# Patient Record
Sex: FEMALE | Race: WHITE | Employment: PART TIME | URBAN - METROPOLITAN AREA
[De-identification: names, ages, dates, MRNs, and addresses within clinical notes are randomized per-mention and may not be internally consistent; named-entity substitution may affect disease eponyms.]

---

## 2017-03-13 ENCOUNTER — OFFICE VISIT (OUTPATIENT)
Dept: FAMILY MEDICINE CLINIC | Facility: CLINIC | Age: 42
End: 2017-03-13

## 2017-03-13 VITALS
DIASTOLIC BLOOD PRESSURE: 70 MMHG | WEIGHT: 190 LBS | HEART RATE: 84 BPM | TEMPERATURE: 98 F | SYSTOLIC BLOOD PRESSURE: 100 MMHG | OXYGEN SATURATION: 98 % | RESPIRATION RATE: 14 BRPM | BODY MASS INDEX: 32 KG/M2

## 2017-03-13 DIAGNOSIS — Z79.899 MEDICATION MANAGEMENT: ICD-10-CM

## 2017-03-13 DIAGNOSIS — F33.1 MODERATE EPISODE OF RECURRENT MAJOR DEPRESSIVE DISORDER (HCC): ICD-10-CM

## 2017-03-13 DIAGNOSIS — J01.40 ACUTE NON-RECURRENT PANSINUSITIS: Primary | ICD-10-CM

## 2017-03-13 PROCEDURE — 99214 OFFICE O/P EST MOD 30 MIN: CPT | Performed by: FAMILY MEDICINE

## 2017-03-13 RX ORDER — AMOXICILLIN AND CLAVULANATE POTASSIUM 875; 125 MG/1; MG/1
1 TABLET, FILM COATED ORAL 2 TIMES DAILY
Qty: 20 TABLET | Refills: 0 | Status: SHIPPED | OUTPATIENT
Start: 2017-03-13 | End: 2017-03-23

## 2017-03-13 RX ORDER — BUPROPION HYDROCHLORIDE 150 MG/1
150 TABLET ORAL DAILY
Qty: 30 TABLET | Refills: 1 | Status: SHIPPED | OUTPATIENT
Start: 2017-03-13 | End: 2017-04-24 | Stop reason: DRUGHIGH

## 2017-03-13 NOTE — PROGRESS NOTES
HPI:   Irving Jaquez is a 42year old female who presents for upper respiratory symptoms for  5  days. Patient reports congestion, sinus pain, prior history of sinusitis, denies fever, denies cough.   Pt. States that she started wellbutrin XL 1.5 weeks ago exertion  GI: no nausea or abdominal pain  NEURO: denies headaches    EXAM:   /70 mmHg  Pulse 84  Temp(Src) 97.8 °F (36.6 °C) (Oral)  Resp 14  Wt 190 lb  SpO2 98%  GENERAL: well developed, well nourished,in no apparent distress  SKIN: no rashes,no mad[place]

## 2017-04-24 NOTE — PROGRESS NOTES
Eloisa Borjas is a 42year old female. HPI:   Pt. Is here for follow up. She states since being treated for sinus infection. Has a hx of sinus disease. Seen ENT in the past.  Not a candidate for sx. Pt. Complains of fatigue.   Has one more semester of Pulse 88  Temp(Src) 98.1 °F (36.7 °C) (Oral)  Resp 14  Wt 192 lb  GENERAL: well developed, well nourished,in no apparent distress  PSYCHE: normal mood and affect  SKIN: no rashes,no suspicious lesions  HEENT: atraumatic, normocephalic,ears and throat with

## 2017-06-06 ENCOUNTER — HOSPITAL ENCOUNTER (OUTPATIENT)
Age: 42
Discharge: HOME | End: 2017-06-06
Payer: COMMERCIAL

## 2017-06-06 VITALS
OXYGEN SATURATION: 100 % | SYSTOLIC BLOOD PRESSURE: 112 MMHG | HEART RATE: 101 BPM | TEMPERATURE: 98 F | DIASTOLIC BLOOD PRESSURE: 72 MMHG | RESPIRATION RATE: 18 BRPM | BODY MASS INDEX: 30 KG/M2 | WEIGHT: 180 LBS

## 2017-06-06 DIAGNOSIS — A09 TRAVELERS' DIARRHEA: Primary | ICD-10-CM

## 2017-06-06 DIAGNOSIS — R14.0 ABDOMINAL BLOATING: ICD-10-CM

## 2017-06-06 DIAGNOSIS — R11.0 NAUSEA: ICD-10-CM

## 2017-06-06 DIAGNOSIS — E87.6 HYPOKALEMIA: ICD-10-CM

## 2017-06-06 PROCEDURE — 81002 URINALYSIS NONAUTO W/O SCOPE: CPT

## 2017-06-06 PROCEDURE — 87086 URINE CULTURE/COLONY COUNT: CPT | Performed by: PHYSICIAN ASSISTANT

## 2017-06-06 PROCEDURE — 99214 OFFICE O/P EST MOD 30 MIN: CPT

## 2017-06-06 PROCEDURE — 96360 HYDRATION IV INFUSION INIT: CPT

## 2017-06-06 PROCEDURE — 80047 BASIC METABLC PNL IONIZED CA: CPT

## 2017-06-06 PROCEDURE — 81025 URINE PREGNANCY TEST: CPT

## 2017-06-06 PROCEDURE — 85025 COMPLETE CBC W/AUTO DIFF WBC: CPT | Performed by: PHYSICIAN ASSISTANT

## 2017-06-06 RX ORDER — DICYCLOMINE HCL 20 MG
20 TABLET ORAL ONCE
Status: COMPLETED | OUTPATIENT
Start: 2017-06-06 | End: 2017-06-06

## 2017-06-06 RX ORDER — ONDANSETRON 4 MG/1
4 TABLET, ORALLY DISINTEGRATING ORAL ONCE
Status: COMPLETED | OUTPATIENT
Start: 2017-06-06 | End: 2017-06-06

## 2017-06-06 RX ORDER — SODIUM CHLORIDE 9 MG/ML
1000 INJECTION, SOLUTION INTRAVENOUS ONCE
Status: COMPLETED | OUTPATIENT
Start: 2017-06-06 | End: 2017-06-06

## 2017-06-06 RX ORDER — CIPROFLOXACIN 500 MG/1
500 TABLET, FILM COATED ORAL 2 TIMES DAILY
Qty: 10 TABLET | Refills: 0 | Status: SHIPPED | OUTPATIENT
Start: 2017-06-06 | End: 2017-06-11

## 2017-06-06 RX ORDER — DICYCLOMINE HCL 20 MG
20 TABLET ORAL 4 TIMES DAILY PRN
Qty: 30 TABLET | Refills: 0 | Status: SHIPPED | OUTPATIENT
Start: 2017-06-06 | End: 2017-07-06

## 2017-06-06 RX ORDER — POTASSIUM CHLORIDE 20 MEQ/1
40 TABLET, EXTENDED RELEASE ORAL ONCE
Status: COMPLETED | OUTPATIENT
Start: 2017-06-06 | End: 2017-06-06

## 2017-06-06 RX ORDER — ONDANSETRON 4 MG/1
4 TABLET, ORALLY DISINTEGRATING ORAL EVERY 4 HOURS PRN
Qty: 20 TABLET | Refills: 0 | Status: SHIPPED | OUTPATIENT
Start: 2017-06-06 | End: 2017-08-30

## 2017-06-07 NOTE — ED PROVIDER NOTES
Patient Seen in: THE [facility] [place]    History   Patient presents with:  Nausea/Vomiting/Diarrhea (gastrointestinal)    Stated Complaint: diarrhea,abdom pain, nausea x4 days     HPI    35 yo female here with c/o diarrhea and N x 4 days. listed in today's nurse's notes are reviewed and agree. The patient's family history is reviewed and is noncontributory to the presenting problem, except as indicated as above.     Review of Systems    Positive for stated complaint: diarrhea,abdom pain, n Chloride 99 (*)     ISTAT Creatinine 1.1 (*)     ISTAT Glucose 115 (*)     All other components within normal limits   POCT PREGNANCY, URINE - Normal   POCT CBC   URINE CULTURE, ROUTINE     NOTE: PT feels better after fluids medications in NAD  MDM       C

## 2017-06-07 NOTE — ED INITIAL ASSESSMENT (HPI)
Patient states she just returned from [facility] and [facility] and has had diarrhea for about 4 days.

## 2017-08-02 ENCOUNTER — OFFICE VISIT (OUTPATIENT)
Dept: FAMILY MEDICINE CLINIC | Facility: CLINIC | Age: 42
End: 2017-08-02

## 2017-08-02 VITALS
DIASTOLIC BLOOD PRESSURE: 70 MMHG | HEART RATE: 72 BPM | BODY MASS INDEX: 29.37 KG/M2 | HEIGHT: 64 IN | SYSTOLIC BLOOD PRESSURE: 114 MMHG | RESPIRATION RATE: 14 BRPM | WEIGHT: 172 LBS

## 2017-08-02 DIAGNOSIS — L74.0: Primary | ICD-10-CM

## 2017-08-02 PROCEDURE — 99213 OFFICE O/P EST LOW 20 MIN: CPT | Performed by: FAMILY MEDICINE

## 2017-08-03 NOTE — PROGRESS NOTES
Jaquelin Campos is a 36year old female. HPI:   Patient is concerned about a cluster of bumps over her labia majora. She states she recently was shaving in the area because she had her  for the first time in 17 years go down on her.   She was concern 0.2 0.2 - 2.0 mg/dL   Nitrite Urine Negative Negative   Leukocyte esterase urine Negative Negative   -POCT PREGNANCY, URINE   Result Value Ref Range   POCT Urine Pregnancy Negative Negative   -POCT CBC   Result Value Ref Range   WBC IC 6.7 4.0 - 13.0 x10ˆ3 murmur  GI: soft, good BS's  ; 0.2-0.4 cm white papules clusters along the medial edge of the labia minora bilaterally -- hard, nontender, not able to express pus  EXTREMITIES: no cyanosis, clubbing or edema    ASSESSMENT AND PLAN:   Sweat retention synd

## 2017-08-30 ENCOUNTER — OFFICE VISIT (OUTPATIENT)
Dept: FAMILY MEDICINE CLINIC | Facility: CLINIC | Age: 42
End: 2017-08-30

## 2017-08-30 VITALS
WEIGHT: 170 LBS | HEART RATE: 72 BPM | BODY MASS INDEX: 29.75 KG/M2 | HEIGHT: 63.5 IN | RESPIRATION RATE: 14 BRPM | DIASTOLIC BLOOD PRESSURE: 68 MMHG | SYSTOLIC BLOOD PRESSURE: 100 MMHG

## 2017-08-30 DIAGNOSIS — Z12.31 ENCOUNTER FOR SCREENING MAMMOGRAM FOR BREAST CANCER: ICD-10-CM

## 2017-08-30 DIAGNOSIS — Z00.00 ROUTINE GENERAL MEDICAL EXAMINATION AT A HEALTH CARE FACILITY: Primary | ICD-10-CM

## 2017-08-30 DIAGNOSIS — Z11.3 SCREENING FOR STD (SEXUALLY TRANSMITTED DISEASE): ICD-10-CM

## 2017-08-30 DIAGNOSIS — Z13.89 SCREENING FOR GENITOURINARY CONDITION: ICD-10-CM

## 2017-08-30 DIAGNOSIS — Z12.4 SCREENING FOR CERVICAL CANCER: ICD-10-CM

## 2017-08-30 DIAGNOSIS — Z00.00 LABORATORY EXAMINATION ORDERED AS PART OF A ROUTINE GENERAL MEDICAL EXAMINATION: ICD-10-CM

## 2017-08-30 LAB
BILIRUBIN: 0
GLUCOSE (URINE DIPSTICK): 0 MG/DL
KETONES (URINE DIPSTICK): 0 MG/DL
LEUKOCYTES: 0
MULTISTIX LOT#: NORMAL NUMERIC
NITRITE, URINE: 0
OCCULT BLOOD: 0
PH, URINE: 5.5 (ref 4.5–8)
PROTEIN (URINE DIPSTICK): 0 MG/DL
SPECIFIC GRAVITY: 1.01 (ref 1–1.03)
UROBILINOGEN,SEMI-QN: 0 MG/DL (ref 0–1.9)

## 2017-08-30 PROCEDURE — 88175 CYTOPATH C/V AUTO FLUID REDO: CPT | Performed by: FAMILY MEDICINE

## 2017-08-30 PROCEDURE — 81003 URINALYSIS AUTO W/O SCOPE: CPT | Performed by: FAMILY MEDICINE

## 2017-08-30 PROCEDURE — 99396 PREV VISIT EST AGE 40-64: CPT | Performed by: FAMILY MEDICINE

## 2017-08-30 PROCEDURE — 87660 TRICHOMONAS VAGIN DIR PROBE: CPT | Performed by: FAMILY MEDICINE

## 2017-08-30 PROCEDURE — 87480 CANDIDA DNA DIR PROBE: CPT | Performed by: FAMILY MEDICINE

## 2017-08-30 PROCEDURE — 87624 HPV HI-RISK TYP POOLED RSLT: CPT | Performed by: FAMILY MEDICINE

## 2017-08-30 PROCEDURE — 87252 VIRUS INOCULATION TISSUE: CPT | Performed by: FAMILY MEDICINE

## 2017-08-30 PROCEDURE — 90471 IMMUNIZATION ADMIN: CPT | Performed by: FAMILY MEDICINE

## 2017-08-30 PROCEDURE — 90746 HEPB VACCINE 3 DOSE ADULT IM: CPT | Performed by: FAMILY MEDICINE

## 2017-08-30 PROCEDURE — 87510 GARDNER VAG DNA DIR PROBE: CPT | Performed by: FAMILY MEDICINE

## 2017-08-30 NOTE — H&P
CC: Annual Physical Exam    HPI:   Markos Lopez is a 36year old female who presents for a complete physical exam. Symptoms: periods are regular. Patient complains of nothing.      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 Exercise: 5 times per week.   Diet: watches fats closely, watches sugar closely and watches calories closely     REVIEW OF SYSTEMS:   GENERAL: feels well otherwise  SKIN: de for mammogram.    Self breast exam explained. Health maintenance, will check fasting Lipids, CMP, CBC, TSH c ref. Seeing holistic physician and she mary a lot of labs on her including vitamin d.    Last eye exam -- due  Last dental exam -- within the 6 m

## 2017-09-01 LAB
C TRACH DNA SPEC QL NAA+PROBE: NEGATIVE
N GONORRHOEA DNA SPEC QL NAA+PROBE: NEGATIVE

## 2017-09-02 LAB — HPV I/H RISK 1 DNA SPEC QL NAA+PROBE: NEGATIVE

## 2017-09-06 ENCOUNTER — LAB ENCOUNTER (OUTPATIENT)
Dept: LAB | Age: 42
End: 2017-09-06
Attending: FAMILY MEDICINE
Payer: COMMERCIAL

## 2017-09-06 DIAGNOSIS — Z00.00 LABORATORY EXAMINATION ORDERED AS PART OF A ROUTINE GENERAL MEDICAL EXAMINATION: ICD-10-CM

## 2017-09-06 LAB
ALBUMIN SERPL-MCNC: 3.8 G/DL (ref 3.5–4.8)
ALP LIVER SERPL-CCNC: 63 U/L (ref 37–98)
ALT SERPL-CCNC: 22 U/L (ref 14–54)
AST SERPL-CCNC: 20 U/L (ref 15–41)
BASOPHILS # BLD AUTO: 0.05 X10(3) UL (ref 0–0.1)
BASOPHILS NFR BLD AUTO: 0.9 %
BILIRUB SERPL-MCNC: 0.5 MG/DL (ref 0.1–2)
BUN BLD-MCNC: 14 MG/DL (ref 8–20)
CALCIUM BLD-MCNC: 9.1 MG/DL (ref 8.3–10.3)
CHLORIDE: 106 MMOL/L (ref 101–111)
CHOLEST SMN-MCNC: 178 MG/DL (ref ?–200)
CO2: 28 MMOL/L (ref 22–32)
CREAT BLD-MCNC: 0.92 MG/DL (ref 0.55–1.02)
EOSINOPHIL # BLD AUTO: 0.21 X10(3) UL (ref 0–0.3)
EOSINOPHIL NFR BLD AUTO: 3.8 %
ERYTHROCYTE [DISTWIDTH] IN BLOOD BY AUTOMATED COUNT: 13.3 % (ref 11.5–16)
GLUCOSE BLD-MCNC: 88 MG/DL (ref 70–99)
HCT VFR BLD AUTO: 43.5 % (ref 34–50)
HDLC SERPL-MCNC: 49 MG/DL (ref 45–?)
HDLC SERPL: 3.63 {RATIO} (ref ?–4.44)
HGB BLD-MCNC: 14 G/DL (ref 12–16)
IMMATURE GRANULOCYTE COUNT: 0.01 X10(3) UL (ref 0–1)
IMMATURE GRANULOCYTE RATIO %: 0.2 %
LDLC SERPL CALC-MCNC: 104 MG/DL (ref ?–130)
LDLC SERPL-MCNC: 25 MG/DL (ref 5–40)
LYMPHOCYTES # BLD AUTO: 1.59 X10(3) UL (ref 0.9–4)
LYMPHOCYTES NFR BLD AUTO: 28.5 %
M PROTEIN MFR SERPL ELPH: 7.2 G/DL (ref 6.1–8.3)
MCH RBC QN AUTO: 27.6 PG (ref 27–33.2)
MCHC RBC AUTO-ENTMCNC: 32.2 G/DL (ref 31–37)
MCV RBC AUTO: 85.6 FL (ref 81–100)
MONOCYTES # BLD AUTO: 0.51 X10(3) UL (ref 0.1–0.6)
MONOCYTES NFR BLD AUTO: 9.2 %
NEUTROPHIL ABS PRELIM: 3.2 X10 (3) UL (ref 1.3–6.7)
NEUTROPHILS # BLD AUTO: 3.2 X10(3) UL (ref 1.3–6.7)
NEUTROPHILS NFR BLD AUTO: 57.4 %
NONHDLC SERPL-MCNC: 129 MG/DL (ref ?–130)
PLATELET # BLD AUTO: 267 10(3)UL (ref 150–450)
POTASSIUM SERPL-SCNC: 4.2 MMOL/L (ref 3.6–5.1)
RBC # BLD AUTO: 5.08 X10(6)UL (ref 3.8–5.1)
RED CELL DISTRIBUTION WIDTH-SD: 41.4 FL (ref 35.1–46.3)
SODIUM SERPL-SCNC: 141 MMOL/L (ref 136–144)
TRIGLYCERIDES: 126 MG/DL (ref ?–150)
TSI SER-ACNC: 2.29 MIU/ML (ref 0.35–5.5)
WBC # BLD AUTO: 5.6 X10(3) UL (ref 4–13)

## 2017-09-06 PROCEDURE — 80061 LIPID PANEL: CPT | Performed by: FAMILY MEDICINE

## 2017-09-06 PROCEDURE — 80050 GENERAL HEALTH PANEL: CPT | Performed by: FAMILY MEDICINE

## 2017-09-06 PROCEDURE — 36415 COLL VENOUS BLD VENIPUNCTURE: CPT | Performed by: FAMILY MEDICINE

## 2017-10-02 NOTE — PROGRESS NOTES
Aleksandr Pacheco is a 36year old female. HPI:   Pt. Is here to discuss labs. Dr. Cary Romeo -- functional medicine. Low on B vitamins. Mildly positive thyroperoxidase and kristy and crp. She will be addressing all this.   Sweat retention syndrome -- clinda g g/dL   Albumin 3.8 3.5 - 4.8 g/dL   Sodium 141 136 - 144 mmol/L   Potassium 4.2 3.6 - 5.1 mmol/L   Chloride 106 101 - 111 mmol/L   CO2 28.0 22.0 - 32.0 mmol/L   -TSH W REFLEX TO FREE T4   Result Value Ref Range   TSH 2.290 0.350 - 5.500 mIU/mL   -CBC W/ DI laboratory test  (primary encounter diagnosis)  Moderate episode of recurrent major depressive disorder (hcc)  Family planning counseling  Vaccine counseling    No orders of the defined types were placed in this encounter.       Meds & Refills for this Visi

## 2017-10-03 ENCOUNTER — OFFICE VISIT (OUTPATIENT)
Dept: FAMILY MEDICINE CLINIC | Facility: CLINIC | Age: 42
End: 2017-10-03

## 2017-10-03 VITALS
DIASTOLIC BLOOD PRESSURE: 60 MMHG | RESPIRATION RATE: 14 BRPM | SYSTOLIC BLOOD PRESSURE: 90 MMHG | BODY MASS INDEX: 28 KG/M2 | HEART RATE: 92 BPM | WEIGHT: 163 LBS

## 2017-10-03 DIAGNOSIS — Z23 NEED FOR VACCINATION: ICD-10-CM

## 2017-10-03 DIAGNOSIS — R89.9 ABNORMAL LABORATORY TEST: Primary | ICD-10-CM

## 2017-10-03 DIAGNOSIS — F33.1 MODERATE EPISODE OF RECURRENT MAJOR DEPRESSIVE DISORDER (HCC): ICD-10-CM

## 2017-10-03 DIAGNOSIS — L74.0: ICD-10-CM

## 2017-10-03 DIAGNOSIS — Z71.85 VACCINE COUNSELING: ICD-10-CM

## 2017-10-03 DIAGNOSIS — Z30.09 FAMILY PLANNING COUNSELING: ICD-10-CM

## 2017-10-03 PROCEDURE — 90471 IMMUNIZATION ADMIN: CPT | Performed by: FAMILY MEDICINE

## 2017-10-03 PROCEDURE — 99214 OFFICE O/P EST MOD 30 MIN: CPT | Performed by: FAMILY MEDICINE

## 2017-10-03 PROCEDURE — 90686 IIV4 VACC NO PRSV 0.5 ML IM: CPT | Performed by: FAMILY MEDICINE

## 2017-10-03 RX ORDER — CLINDAMYCIN PHOSPHATE 10 MG/G
1 GEL TOPICAL NIGHTLY
Qty: 60 G | Refills: 5 | Status: SHIPPED | OUTPATIENT
Start: 2017-10-03 | End: 2018-02-28

## 2017-10-03 RX ORDER — NORETHINDRONE ACETATE AND ETHINYL ESTRADIOL 1; .02 MG/1; MG/1
1 TABLET ORAL DAILY
Qty: 84 TABLET | Refills: 1 | Status: SHIPPED | OUTPATIENT
Start: 2017-10-03 | End: 2018-06-05

## 2017-10-05 ENCOUNTER — TELEPHONE (OUTPATIENT)
Dept: FAMILY MEDICINE CLINIC | Facility: CLINIC | Age: 42
End: 2017-10-05

## 2017-10-05 NOTE — TELEPHONE ENCOUNTER
Received response letter from [address] stating that they do not handle PA's that the information needs to be faxed to [facility], [phone].

## 2017-10-18 ENCOUNTER — CHARTING TRANS (OUTPATIENT)
Dept: OTHER | Age: 42
End: 2017-10-18

## 2017-10-18 ENCOUNTER — IMAGING SERVICES (OUTPATIENT)
Dept: OTHER | Age: 42
End: 2017-10-18

## 2017-10-23 NOTE — TELEPHONE ENCOUNTER
Outgoing call to [facility], to check the status of PA, PA specialist informed me that the PA was not received, PA re faxed to [phone].

## 2017-10-25 RX ORDER — CLINDAMYCIN PHOSPHATE 10 MG/ML
1 SOLUTION TOPICAL 2 TIMES DAILY
Qty: 60 EACH | Refills: 2 | Status: SHIPPED | OUTPATIENT
Start: 2017-10-25 | End: 2018-02-28

## 2017-10-25 NOTE — TELEPHONE ENCOUNTER
Letter of determination received, Clindamycin phosphate gel not covered as excluded from coverage under plan. Alternative is listed as generic Cleocin-T swabs.   Please advise

## 2017-11-08 RX ORDER — BUPROPION HYDROCHLORIDE 300 MG/1
TABLET ORAL
Qty: 90 TABLET | Refills: 1 | Status: SHIPPED | OUTPATIENT
Start: 2017-11-08 | End: 2018-02-28

## 2017-11-08 NOTE — TELEPHONE ENCOUNTER
Not protocol medication. LOV : 8/30/17 physical   Last labs done :9/06/17  Next appointment :2/28/17  Please see pending medication. Refill if appropriate.    Last refill:    Date:9/30/2017  Amount :30 days   Medication:    BUPROPION XL 300MG TABLETS  Sylvain

## 2018-01-22 RX ORDER — BUPROPION HYDROCHLORIDE 300 MG/1
TABLET ORAL
Qty: 30 TABLET | Refills: 0 | OUTPATIENT
Start: 2018-01-22

## 2018-01-22 NOTE — TELEPHONE ENCOUNTER
Pt called ofc now, speaking with Iliana Roman stating she is at the pharmacy now, [place] the [place], needs wellbutrin refill now. Record shows refill request received 1/20 (saturday) afternoon.    Record shows last ofc visit 10/3/17 with [place]

## 2018-02-28 ENCOUNTER — TELEPHONE (OUTPATIENT)
Dept: FAMILY MEDICINE CLINIC | Facility: CLINIC | Age: 43
End: 2018-02-28

## 2018-02-28 NOTE — TELEPHONE ENCOUNTER
LVM to call back. Per Antony Larose would like to inform pt a stress test was added. She can call [phone] to schedule an appointment. Stress test added as a precaution.

## 2018-02-28 NOTE — PROGRESS NOTES
Wilbert Duval is a 36year old female. HPI:   Patient is here for medication follow-up. Patient states that in the last few weeks she unfortunately missed 3 doses of her Wellbutrin and noted that she definitely needs the medication.   She is currently goi past medical history on file.    Social History:  Smoking status: Never Smoker                                                              Smokeless tobacco: Never Used                      Alcohol use: Yes           0.6 oz/week     Glasses of wine: 1 per Eosinophil % 3.8 %   Basophil % 0.9 %   Immature Granulocyte % 0.2 %       REVIEW OF SYSTEMS:   GENERAL: feels well otherwise  SKIN: denies any unusual skin lesions  EYES:denies blurred vision or double vision  HEENT: denies nasal congestion, sinus pain from her ; monitor anxiety-like symptoms  Chest pressure–probably most likely due to anxiety-like symptoms and she is not sleeping well but will order a stress test  Inclusion cyst–advised sitz bath but the patient states they did not help; told her

## 2018-04-20 NOTE — PROGRESS NOTES
Mattie Ellington is a 36year old female. HPI:   Patient is here for medication follow-up.   Patient states that a few days after our last visit, she was in therapy with her  and did admit to him that she could not handle the issues with him, and their mmol/L   Potassium 4.2 3.6 - 5.1 mmol/L   Chloride 106 101 - 111 mmol/L   CO2 28.0 22.0 - 32.0 mmol/L   -TSH W REFLEX TO FREE T4   Result Value Ref Range   TSH 2.290 0.350 - 5.500 mIU/mL   -CBC W/ DIFFERENTIAL   Result Value Ref Range   WBC 5.6 4.0 - 13.0 depressive disorder (hcc)  (primary encounter diagnosis)  Follow up  Anxiety  Insomnia, unspecified type    No orders of the defined types were placed in this encounter.       Meds & Refills for this Visit:  No prescriptions requested or ordered in this enc

## 2018-06-05 RX ORDER — NORETHINDRONE ACETATE AND ETHINYL ESTRADIOL AND FERROUS FUMARATE 1MG-20(21)
KIT ORAL
Qty: 84 TABLET | Refills: 0 | Status: SHIPPED | OUTPATIENT
Start: 2018-06-05 | End: 2019-05-01

## 2018-06-21 ENCOUNTER — OFFICE VISIT (OUTPATIENT)
Dept: FAMILY MEDICINE CLINIC | Facility: CLINIC | Age: 43
End: 2018-06-21

## 2018-06-21 VITALS
TEMPERATURE: 99 F | DIASTOLIC BLOOD PRESSURE: 60 MMHG | HEIGHT: 64 IN | OXYGEN SATURATION: 100 % | RESPIRATION RATE: 20 BRPM | BODY MASS INDEX: 23.9 KG/M2 | SYSTOLIC BLOOD PRESSURE: 110 MMHG | HEART RATE: 91 BPM | WEIGHT: 140 LBS

## 2018-06-21 DIAGNOSIS — R30.0 DYSURIA: Primary | ICD-10-CM

## 2018-06-21 PROCEDURE — 99213 OFFICE O/P EST LOW 20 MIN: CPT | Performed by: NURSE PRACTITIONER

## 2018-06-21 PROCEDURE — 81003 URINALYSIS AUTO W/O SCOPE: CPT | Performed by: NURSE PRACTITIONER

## 2018-06-21 RX ORDER — NITROFURANTOIN 25; 75 MG/1; MG/1
100 CAPSULE ORAL 2 TIMES DAILY
Qty: 14 CAPSULE | Refills: 0 | Status: SHIPPED | OUTPATIENT
Start: 2018-06-21 | End: 2018-06-28

## 2018-06-21 NOTE — PROGRESS NOTES
CHIEF COMPLAINT:   Patient presents with:  Urinary Frequency: pain and pressure x 3 days    HPI:   Monika Crandall is a 39year old female who presents with symptoms of dysuria and frequency for last 3 days. Symptoms have been about the same since onset.  Gustabo Chong /60   Pulse 91   Temp 98.7 °F (37.1 °C) (Oral)   Resp 20   Ht 64\"   Wt 140 lb   LMP 06/07/2018   SpO2 100%   BMI 24.03 kg/m²   GENERAL: well developed, well nourished,in no apparent distress  CARDIO: RRR, no murmurs  LUNGS: clear to ausculation bila The patient indicates understanding of these issues and agrees to the plan. The patient is asked to contact their PCP in 3 days if not better. Seek immediate care if symptoms are worsening.      Patient Instructions     Bladder Infection, Female (Adult) The most common cause of bladder infections is bacteria from the bowels. The bacteria get onto the skin around the opening of the urethra. From there, they can get into the urine and travel up to the bladder, causing inflammation and infection.  This usuall · Urinate more often. Don't try to hold urine in for a long time. · Wear loose-fitting clothes and cotton underwear. Avoid tight-fitting pants. · Improve your diet and prevent constipation.  Eat more fresh fruit and vegetables, and fiber, and less junk an

## 2018-10-10 RX ORDER — BUPROPION HYDROCHLORIDE 300 MG/1
TABLET ORAL
Qty: 30 TABLET | Refills: 0 | Status: SHIPPED | OUTPATIENT
Start: 2018-10-10 | End: 2019-05-01

## 2018-10-10 NOTE — TELEPHONE ENCOUNTER
Not protocol medication. LOV :4/20/18 medication follow up   Last labs done :9/06/17  Next appointment :12/19/18  Please see pending medication. Refill if appropriate.    Last refill:    Date:2/28/18  Amount :30 tablets 5 refills   Medication: bupropion

## 2018-11-02 VITALS
OXYGEN SATURATION: 98 % | RESPIRATION RATE: 18 BRPM | DIASTOLIC BLOOD PRESSURE: 68 MMHG | BODY MASS INDEX: 26.12 KG/M2 | TEMPERATURE: 98.1 F | SYSTOLIC BLOOD PRESSURE: 100 MMHG | WEIGHT: 153 LBS | HEIGHT: 64 IN | HEART RATE: 102 BPM

## 2019-05-01 ENCOUNTER — HOSPITAL ENCOUNTER (OUTPATIENT)
Age: 44
Discharge: HOME | End: 2019-05-01
Attending: FAMILY MEDICINE
Payer: COMMERCIAL

## 2019-05-01 VITALS
RESPIRATION RATE: 16 BRPM | OXYGEN SATURATION: 98 % | HEART RATE: 88 BPM | TEMPERATURE: 98 F | DIASTOLIC BLOOD PRESSURE: 77 MMHG | SYSTOLIC BLOOD PRESSURE: 104 MMHG

## 2019-05-01 DIAGNOSIS — J30.2 SEASONAL ALLERGIC RHINITIS, UNSPECIFIED TRIGGER: ICD-10-CM

## 2019-05-01 DIAGNOSIS — J01.00 ACUTE NON-RECURRENT MAXILLARY SINUSITIS: Primary | ICD-10-CM

## 2019-05-01 PROCEDURE — 99213 OFFICE O/P EST LOW 20 MIN: CPT

## 2019-05-01 PROCEDURE — 99214 OFFICE O/P EST MOD 30 MIN: CPT

## 2019-05-01 RX ORDER — PREDNISONE 20 MG/1
TABLET ORAL
Qty: 5 TABLET | Refills: 0 | Status: SHIPPED | OUTPATIENT
Start: 2019-05-01 | End: 2019-05-22 | Stop reason: ALTCHOICE

## 2019-05-01 RX ORDER — FLUTICASONE PROPIONATE 50 MCG
SPRAY, SUSPENSION (ML) NASAL
Qty: 1 INHALER | Refills: 0 | Status: SHIPPED | OUTPATIENT
Start: 2019-05-01

## 2019-05-01 RX ORDER — AMOXICILLIN AND CLAVULANATE POTASSIUM 875; 125 MG/1; MG/1
875 TABLET, FILM COATED ORAL 2 TIMES DAILY
Qty: 20 TABLET | Refills: 0 | Status: SHIPPED | OUTPATIENT
Start: 2019-05-01 | End: 2019-05-22 | Stop reason: ALTCHOICE

## 2019-05-01 NOTE — ED PROVIDER NOTES
Patient Seen in: [facility]    History   Patient presents with:  Stuffy Nose    Stated Complaint: SINUS headache , mucas , sinus press x 2 weeks     HPI    This 79-year-old female presents to the office with a 2-week history of normal.  EARS: Tympanic membranes normal, EAC's normal.  NOSE: Turbinates congested, no bleeding noted. Tender over the frontal and maxillary sinuses.   PHARYNX:  No eythema or exudates, post nasal drip is noted, airway patent, uvula midline  NECK:  No cer days.  Padma Hun: 5 tablet Refills: 0    Fluticasone Propionate 50 MCG/ACT Nasal Suspension  Use 2 sprays each nostril once daily after shower. Stop if you develop nose bleeds.   Qty: 1 Inhaler Refills: 0           Take the Augmentin 875 mg 1 tablet with breakfas

## 2019-05-01 NOTE — ED INITIAL ASSESSMENT (HPI)
Complains of head and nose congestion for the last two weeks. Here today as she feels she has no energy due to discomfort.

## 2019-05-22 ENCOUNTER — OFFICE VISIT (OUTPATIENT)
Dept: FAMILY MEDICINE CLINIC | Facility: CLINIC | Age: 44
End: 2019-05-22
Payer: COMMERCIAL

## 2019-05-22 VITALS
TEMPERATURE: 99 F | RESPIRATION RATE: 16 BRPM | WEIGHT: 151 LBS | SYSTOLIC BLOOD PRESSURE: 98 MMHG | HEART RATE: 86 BPM | DIASTOLIC BLOOD PRESSURE: 68 MMHG | BODY MASS INDEX: 26.75 KG/M2 | OXYGEN SATURATION: 98 % | HEIGHT: 63 IN

## 2019-05-22 DIAGNOSIS — Z13.89 SCREENING FOR GENITOURINARY CONDITION: ICD-10-CM

## 2019-05-22 DIAGNOSIS — F33.9 EPISODE OF RECURRENT MAJOR DEPRESSIVE DISORDER, UNSPECIFIED DEPRESSION EPISODE SEVERITY (HCC): ICD-10-CM

## 2019-05-22 DIAGNOSIS — Z13.0 SCREENING FOR ENDOCRINE, METABOLIC AND IMMUNITY DISORDER: ICD-10-CM

## 2019-05-22 DIAGNOSIS — Z12.4 SCREENING FOR CERVICAL CANCER: ICD-10-CM

## 2019-05-22 DIAGNOSIS — Z00.00 ROUTINE GENERAL MEDICAL EXAMINATION AT HEALTH CARE FACILITY: Primary | ICD-10-CM

## 2019-05-22 DIAGNOSIS — Z13.29 SCREENING FOR ENDOCRINE, METABOLIC AND IMMUNITY DISORDER: ICD-10-CM

## 2019-05-22 DIAGNOSIS — Z13.228 SCREENING FOR ENDOCRINE, METABOLIC AND IMMUNITY DISORDER: ICD-10-CM

## 2019-05-22 DIAGNOSIS — Z88.9 MULTIPLE ALLERGIES: ICD-10-CM

## 2019-05-22 DIAGNOSIS — Z00.00 LABORATORY EXAMINATION ORDERED AS PART OF A ROUTINE GENERAL MEDICAL EXAMINATION: ICD-10-CM

## 2019-05-22 DIAGNOSIS — R79.89 ABNORMAL CORTISOL LEVEL: ICD-10-CM

## 2019-05-22 DIAGNOSIS — Z71.84 COUNSELING ABOUT TRAVEL: ICD-10-CM

## 2019-05-22 DIAGNOSIS — Z11.3 SCREENING EXAMINATION FOR STD (SEXUALLY TRANSMITTED DISEASE): ICD-10-CM

## 2019-05-22 DIAGNOSIS — Z30.09 FAMILY PLANNING COUNSELING: ICD-10-CM

## 2019-05-22 DIAGNOSIS — Z23 NEED FOR VACCINATION: ICD-10-CM

## 2019-05-22 DIAGNOSIS — Z12.39 SCREENING FOR BREAST CANCER: ICD-10-CM

## 2019-05-22 PROCEDURE — 81003 URINALYSIS AUTO W/O SCOPE: CPT | Performed by: NURSE PRACTITIONER

## 2019-05-22 PROCEDURE — 99396 PREV VISIT EST AGE 40-64: CPT | Performed by: NURSE PRACTITIONER

## 2019-05-22 PROCEDURE — 90715 TDAP VACCINE 7 YRS/> IM: CPT | Performed by: NURSE PRACTITIONER

## 2019-05-22 PROCEDURE — 87660 TRICHOMONAS VAGIN DIR PROBE: CPT | Performed by: NURSE PRACTITIONER

## 2019-05-22 PROCEDURE — 87624 HPV HI-RISK TYP POOLED RSLT: CPT | Performed by: NURSE PRACTITIONER

## 2019-05-22 PROCEDURE — 87591 N.GONORRHOEAE DNA AMP PROB: CPT | Performed by: NURSE PRACTITIONER

## 2019-05-22 PROCEDURE — 87480 CANDIDA DNA DIR PROBE: CPT | Performed by: NURSE PRACTITIONER

## 2019-05-22 PROCEDURE — 87491 CHLMYD TRACH DNA AMP PROBE: CPT | Performed by: NURSE PRACTITIONER

## 2019-05-22 PROCEDURE — 90471 IMMUNIZATION ADMIN: CPT | Performed by: NURSE PRACTITIONER

## 2019-05-22 PROCEDURE — 87510 GARDNER VAG DNA DIR PROBE: CPT | Performed by: NURSE PRACTITIONER

## 2019-05-22 PROCEDURE — 87625 HPV TYPES 16 & 18 ONLY: CPT | Performed by: NURSE PRACTITIONER

## 2019-05-22 RX ORDER — DULOXETIN HYDROCHLORIDE 30 MG/1
30 CAPSULE, DELAYED RELEASE ORAL DAILY
Refills: 1 | COMMUNITY
Start: 2019-03-01

## 2019-05-22 NOTE — H&P
HPI:   Markos Lopez is a 39year old female who presents for a complete physical exam. Symptoms: denies discharge, itching, burning or dysuria, periods are regular, flow is 6 days. Patient denies any history of an abnormal pap.  She reports family history Esterase Urine neg Negative    APPEARANCE clear Clear    Color Urine yellow Yellow    Multistix Lot# [id number] Numeric    Multistix Expiration Date 02/28/2019 Date          Current Outpatient Medications:  Multiple Vitamin (MULTI-VITAMIN) Oral Tab Take 1 tab exertion  CHEST:  denies breast changes or pain  CARDIOVASCULAR: denies chest pain or tightness on exertion: no edema  VASCULAR: denies leg cramps  GI: denies abdominal pain, bowel movement changes, blood in stool  : denies urinary problems, vaginal disc maintenance guidance given including vision and dental exams. Lifestyle guidance provided recommended low fat diet and aerobic exercise 30 minutes 3-4 times weekly. The patient indicates understanding of these issues and agrees to the plan.   The patient i SURFACE ANTIGEN; Future  - HCV ANTIBODY; Future  - CHLAMYDIA/GONOCOCCUS, SHIKHA;  Future  - CHLAMYDIA/GONOCOCCUS, SHIKHA  - VAGINITIS/VAGINOSIS, DNA PROBE; Future  - VAGINITIS/VAGINOSIS, DNA PROBE    10. Screening for cervical cancer  - THINPREP PAP WITH HPV REFL

## 2019-05-24 RX ORDER — METRONIDAZOLE 7.5 MG/G
1 GEL VAGINAL NIGHTLY
Qty: 1 TUBE | Refills: 0 | Status: SHIPPED | OUTPATIENT
Start: 2019-05-24 | End: 2019-05-29

## 2019-05-28 ENCOUNTER — LAB ENCOUNTER (OUTPATIENT)
Dept: LAB | Age: 44
End: 2019-05-28
Attending: FAMILY MEDICINE
Payer: COMMERCIAL

## 2019-05-28 DIAGNOSIS — Z88.9 MULTIPLE ALLERGIES: ICD-10-CM

## 2019-05-28 DIAGNOSIS — Z13.0 SCREENING FOR ENDOCRINE, METABOLIC AND IMMUNITY DISORDER: ICD-10-CM

## 2019-05-28 DIAGNOSIS — Z13.228 SCREENING FOR ENDOCRINE, METABOLIC AND IMMUNITY DISORDER: ICD-10-CM

## 2019-05-28 DIAGNOSIS — Z13.29 SCREENING FOR ENDOCRINE, METABOLIC AND IMMUNITY DISORDER: ICD-10-CM

## 2019-05-28 DIAGNOSIS — Z00.00 ROUTINE GENERAL MEDICAL EXAMINATION AT HEALTH CARE FACILITY: ICD-10-CM

## 2019-05-28 DIAGNOSIS — Z00.00 LABORATORY EXAMINATION ORDERED AS PART OF A ROUTINE GENERAL MEDICAL EXAMINATION: ICD-10-CM

## 2019-05-28 DIAGNOSIS — R79.89 ABNORMAL CORTISOL LEVEL: ICD-10-CM

## 2019-05-28 DIAGNOSIS — Z11.3 SCREENING EXAMINATION FOR STD (SEXUALLY TRANSMITTED DISEASE): ICD-10-CM

## 2019-05-28 DIAGNOSIS — R79.89 ELEVATED TSH: ICD-10-CM

## 2019-05-28 PROCEDURE — 84439 ASSAY OF FREE THYROXINE: CPT | Performed by: NURSE PRACTITIONER

## 2019-05-28 PROCEDURE — 86765 RUBEOLA ANTIBODY: CPT | Performed by: NURSE PRACTITIONER

## 2019-05-28 PROCEDURE — 86762 RUBELLA ANTIBODY: CPT | Performed by: NURSE PRACTITIONER

## 2019-05-28 PROCEDURE — 86800 THYROGLOBULIN ANTIBODY: CPT | Performed by: NURSE PRACTITIONER

## 2019-05-28 PROCEDURE — 86803 HEPATITIS C AB TEST: CPT | Performed by: NURSE PRACTITIONER

## 2019-05-28 PROCEDURE — 80061 LIPID PANEL: CPT | Performed by: NURSE PRACTITIONER

## 2019-05-28 PROCEDURE — 87389 HIV-1 AG W/HIV-1&-2 AB AG IA: CPT | Performed by: NURSE PRACTITIONER

## 2019-05-28 PROCEDURE — 86735 MUMPS ANTIBODY: CPT | Performed by: NURSE PRACTITIONER

## 2019-05-28 PROCEDURE — 86706 HEP B SURFACE ANTIBODY: CPT | Performed by: NURSE PRACTITIONER

## 2019-05-28 PROCEDURE — 82533 TOTAL CORTISOL: CPT | Performed by: NURSE PRACTITIONER

## 2019-05-28 PROCEDURE — 87340 HEPATITIS B SURFACE AG IA: CPT | Performed by: NURSE PRACTITIONER

## 2019-05-28 PROCEDURE — 86376 MICROSOMAL ANTIBODY EACH: CPT | Performed by: NURSE PRACTITIONER

## 2019-05-28 PROCEDURE — 80050 GENERAL HEALTH PANEL: CPT | Performed by: NURSE PRACTITIONER

## 2019-05-28 PROCEDURE — 36415 COLL VENOUS BLD VENIPUNCTURE: CPT | Performed by: NURSE PRACTITIONER

## 2019-05-28 PROCEDURE — 86003 ALLG SPEC IGE CRUDE XTRC EA: CPT | Performed by: NURSE PRACTITIONER

## 2019-05-28 PROCEDURE — 86780 TREPONEMA PALLIDUM: CPT | Performed by: NURSE PRACTITIONER

## 2019-05-31 ENCOUNTER — TELEPHONE (OUTPATIENT)
Dept: FAMILY MEDICINE CLINIC | Facility: CLINIC | Age: 44
End: 2019-05-31

## 2019-05-31 DIAGNOSIS — Z91.018 MULTIPLE FOOD ALLERGIES: ICD-10-CM

## 2019-05-31 DIAGNOSIS — R79.89 ELEVATED TSH: Primary | ICD-10-CM

## 2019-06-13 ENCOUNTER — PATIENT MESSAGE (OUTPATIENT)
Dept: FAMILY MEDICINE CLINIC | Facility: CLINIC | Age: 44
End: 2019-06-13

## 2019-09-05 ENCOUNTER — HOSPITAL ENCOUNTER (OUTPATIENT)
Age: 44
Discharge: HOME | End: 2019-09-05
Attending: EMERGENCY MEDICINE
Payer: COMMERCIAL

## 2019-09-05 VITALS
HEART RATE: 96 BPM | BODY MASS INDEX: 25.61 KG/M2 | SYSTOLIC BLOOD PRESSURE: 108 MMHG | DIASTOLIC BLOOD PRESSURE: 63 MMHG | HEIGHT: 64 IN | OXYGEN SATURATION: 97 % | RESPIRATION RATE: 16 BRPM | WEIGHT: 150 LBS | TEMPERATURE: 98 F

## 2019-09-05 DIAGNOSIS — J02.9 ACUTE SORE THROAT: Primary | ICD-10-CM

## 2019-09-05 LAB — POCT RAPID STREP: NEGATIVE

## 2019-09-05 PROCEDURE — 87147 CULTURE TYPE IMMUNOLOGIC: CPT | Performed by: EMERGENCY MEDICINE

## 2019-09-05 PROCEDURE — 87081 CULTURE SCREEN ONLY: CPT | Performed by: EMERGENCY MEDICINE

## 2019-09-05 PROCEDURE — 87430 STREP A AG IA: CPT | Performed by: EMERGENCY MEDICINE

## 2019-09-05 PROCEDURE — 99213 OFFICE O/P EST LOW 20 MIN: CPT

## 2019-09-05 PROCEDURE — 99214 OFFICE O/P EST MOD 30 MIN: CPT

## 2019-09-05 RX ORDER — IBUPROFEN 600 MG/1
600 TABLET ORAL EVERY 6 HOURS PRN
COMMUNITY

## 2019-09-05 NOTE — ED PROVIDER NOTES
Patient Seen in: [facility]    History   Patient presents with:  Sore Throat    Stated Complaint: Sore throat x 1 day    HPI    41-year-old female presents with a 1 day history of sore throat. She states is painful to swallow. Uvula is midline, oropharynx is clear and moist and mucous membranes are normal. No uvula swelling. No oropharyngeal exudate or tonsillar abscesses.    Minimal erythema no exudates noted uvula is midline, cobblestoning noted in posterior pharynx   Neck: Nor

## 2019-12-27 ENCOUNTER — HOSPITAL ENCOUNTER (OUTPATIENT)
Age: 44
Discharge: HOME | End: 2019-12-27
Payer: COMMERCIAL

## 2019-12-27 VITALS
DIASTOLIC BLOOD PRESSURE: 79 MMHG | SYSTOLIC BLOOD PRESSURE: 125 MMHG | OXYGEN SATURATION: 99 % | TEMPERATURE: 98 F | RESPIRATION RATE: 18 BRPM | HEART RATE: 90 BPM

## 2019-12-27 DIAGNOSIS — T19.2XXA RETAINED TAMPON, INITIAL ENCOUNTER: Primary | ICD-10-CM

## 2019-12-27 PROCEDURE — 99214 OFFICE O/P EST MOD 30 MIN: CPT

## 2020-01-26 ENCOUNTER — TELEPHONE (OUTPATIENT)
Dept: FAMILY MEDICINE CLINIC | Facility: CLINIC | Age: 45
End: 2020-01-26

## 2020-01-26 ENCOUNTER — MOBILE ENCOUNTER (OUTPATIENT)
Dept: FAMILY MEDICINE CLINIC | Facility: CLINIC | Age: 45
End: 2020-01-26

## 2020-01-26 RX ORDER — NITROFURANTOIN 25; 75 MG/1; MG/1
100 CAPSULE ORAL 2 TIMES DAILY
Qty: 20 CAPSULE | Refills: 0 | Status: SHIPPED | OUTPATIENT
Start: 2020-01-26 | End: 2020-02-05

## 2020-01-27 NOTE — TELEPHONE ENCOUNTER
Paged by patient with 24 hours of worsening dysuria and increased urinary frequency and urgency. States she gets a UTI about every 5 months and is asking for an antibiotic.   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

## 2020-12-09 ENCOUNTER — HOSPITAL ENCOUNTER (OUTPATIENT)
Dept: ULTRASOUND IMAGING | Age: 45
Discharge: HOME | End: 2020-12-09
Attending: PHYSICIAN ASSISTANT
Payer: COMMERCIAL

## 2020-12-09 ENCOUNTER — HOSPITAL ENCOUNTER (OUTPATIENT)
Dept: MAMMOGRAPHY | Age: 45
Discharge: HOME | End: 2020-12-09
Attending: PHYSICIAN ASSISTANT
Payer: COMMERCIAL

## 2020-12-09 DIAGNOSIS — N63.11 UNSPECIFIED LUMP IN THE RIGHT BREAST, UPPER OUTER QUADRANT: ICD-10-CM

## 2020-12-09 DIAGNOSIS — R10.11 RIGHT UPPER QUADRANT PAIN: ICD-10-CM

## 2020-12-09 DIAGNOSIS — H10.32 ACUTE CONJUNCTIVITIS OF LEFT EYE, UNSPECIFIED ACUTE CONJUNCTIVITIS TYPE: ICD-10-CM

## 2020-12-09 PROCEDURE — 77066 DX MAMMO INCL CAD BI: CPT | Performed by: PHYSICIAN ASSISTANT

## 2020-12-09 PROCEDURE — 77062 BREAST TOMOSYNTHESIS BI: CPT | Performed by: PHYSICIAN ASSISTANT

## 2020-12-09 PROCEDURE — 76641 ULTRASOUND BREAST COMPLETE: CPT | Performed by: PHYSICIAN ASSISTANT

## 2020-12-09 PROCEDURE — 76642 ULTRASOUND BREAST LIMITED: CPT | Performed by: PHYSICIAN ASSISTANT

## 2022-11-10 NOTE — TELEPHONE ENCOUNTER
She has a physical for 12/19/18 with Dr. Lenn Buerger. Is this enough or does she need an actual med visit. Please let me know. Quinolones Counseling:  I discussed with the patient the risks of fluoroquinolones including but not limited to GI upset, allergic reaction, drug rash, diarrhea, dizziness, photosensitivity, yeast infections, liver function test abnormalities, tendonitis/tendon rupture.

## 2024-01-04 PROBLEM — F43.29 ADJUSTMENT DISORDER WITH MIXED EMOTIONAL FEATURES: Status: ACTIVE | Noted: 2024-01-04

## 2024-01-04 PROBLEM — E06.3 AUTOIMMUNE HYPOTHYROIDISM: Status: ACTIVE | Noted: 2024-01-04

## 2024-01-04 PROBLEM — G43.909 MIGRAINE: Status: ACTIVE | Noted: 2024-01-04

## 2024-01-07 RX ORDER — INDOMETHACIN 25 MG/1
50 CAPSULE ORAL 2 TIMES DAILY WITH MEALS
COMMUNITY
Start: 2020-09-24

## 2024-01-08 ENCOUNTER — OFFICE VISIT (OUTPATIENT)
Dept: FAMILY MEDICINE CLINIC | Facility: CLINIC | Age: 49
End: 2024-01-08
Payer: COMMERCIAL

## 2024-01-08 VITALS
HEART RATE: 100 BPM | OXYGEN SATURATION: 100 % | RESPIRATION RATE: 16 BRPM | BODY MASS INDEX: 33.25 KG/M2 | TEMPERATURE: 98 F | DIASTOLIC BLOOD PRESSURE: 74 MMHG | WEIGHT: 190 LBS | SYSTOLIC BLOOD PRESSURE: 122 MMHG | HEIGHT: 63.5 IN

## 2024-01-08 DIAGNOSIS — Z00.00 ROUTINE GENERAL MEDICAL EXAMINATION AT A HEALTH CARE FACILITY: Primary | ICD-10-CM

## 2024-01-08 DIAGNOSIS — Z13.0 SCREENING FOR ENDOCRINE, METABOLIC AND IMMUNITY DISORDER: ICD-10-CM

## 2024-01-08 DIAGNOSIS — Z11.3 SCREENING FOR STD (SEXUALLY TRANSMITTED DISEASE): ICD-10-CM

## 2024-01-08 DIAGNOSIS — E06.3 AUTOIMMUNE HYPOTHYROIDISM: ICD-10-CM

## 2024-01-08 DIAGNOSIS — N39.46 URGE AND STRESS INCONTINENCE: ICD-10-CM

## 2024-01-08 DIAGNOSIS — Z00.00 LABORATORY EXAMINATION ORDERED AS PART OF A ROUTINE GENERAL MEDICAL EXAMINATION: ICD-10-CM

## 2024-01-08 DIAGNOSIS — Z12.31 SCREENING MAMMOGRAM FOR BREAST CANCER: ICD-10-CM

## 2024-01-08 DIAGNOSIS — Z13.228 SCREENING FOR ENDOCRINE, METABOLIC AND IMMUNITY DISORDER: ICD-10-CM

## 2024-01-08 DIAGNOSIS — Z13.89 SCREENING FOR GENITOURINARY CONDITION: ICD-10-CM

## 2024-01-08 DIAGNOSIS — Z12.4 SCREENING FOR MALIGNANT NEOPLASM OF CERVIX: ICD-10-CM

## 2024-01-08 DIAGNOSIS — Z13.29 SCREENING FOR ENDOCRINE, METABOLIC AND IMMUNITY DISORDER: ICD-10-CM

## 2024-01-08 LAB
ALBUMIN SERPL-MCNC: 4.1 G/DL (ref 3.4–5)
ALBUMIN/GLOB SERPL: 1.1 {RATIO} (ref 1–2)
ALP LIVER SERPL-CCNC: 75 U/L
ALT SERPL-CCNC: 35 U/L
ANION GAP SERPL CALC-SCNC: 5 MMOL/L (ref 0–18)
APPEARANCE: CLEAR
AST SERPL-CCNC: 32 U/L (ref 15–37)
BASOPHILS # BLD AUTO: 0.08 X10(3) UL (ref 0–0.2)
BASOPHILS NFR BLD AUTO: 1.4 %
BILIRUB SERPL-MCNC: 0.3 MG/DL (ref 0.1–2)
BILIRUBIN: NEGATIVE
BUN BLD-MCNC: 15 MG/DL (ref 9–23)
CALCIUM BLD-MCNC: 9.4 MG/DL (ref 8.5–10.1)
CHLORIDE SERPL-SCNC: 105 MMOL/L (ref 98–112)
CHOLEST SERPL-MCNC: 201 MG/DL (ref ?–200)
CO2 SERPL-SCNC: 27 MMOL/L (ref 21–32)
CREAT BLD-MCNC: 0.9 MG/DL
EGFRCR SERPLBLD CKD-EPI 2021: 78 ML/MIN/1.73M2 (ref 60–?)
EOSINOPHIL # BLD AUTO: 0.56 X10(3) UL (ref 0–0.7)
EOSINOPHIL NFR BLD AUTO: 9.6 %
ERYTHROCYTE [DISTWIDTH] IN BLOOD BY AUTOMATED COUNT: 13.2 %
FASTING PATIENT LIPID ANSWER: NO
FASTING STATUS PATIENT QL REPORTED: NO
GLOBULIN PLAS-MCNC: 3.8 G/DL (ref 2.8–4.4)
GLUCOSE (URINE DIPSTICK): NEGATIVE MG/DL
GLUCOSE BLD-MCNC: 96 MG/DL (ref 70–99)
HCT VFR BLD AUTO: 46.8 %
HDLC SERPL-MCNC: 48 MG/DL (ref 40–59)
HGB BLD-MCNC: 14.9 G/DL
IMM GRANULOCYTES # BLD AUTO: 0.05 X10(3) UL (ref 0–1)
IMM GRANULOCYTES NFR BLD: 0.9 %
KETONES (URINE DIPSTICK): NEGATIVE MG/DL
LDLC SERPL CALC-MCNC: 110 MG/DL (ref ?–100)
LEUKOCYTES: NEGATIVE
LYMPHOCYTES # BLD AUTO: 1.83 X10(3) UL (ref 1–4)
LYMPHOCYTES NFR BLD AUTO: 31.2 %
MCH RBC QN AUTO: 27.7 PG (ref 26–34)
MCHC RBC AUTO-ENTMCNC: 31.8 G/DL (ref 31–37)
MCV RBC AUTO: 87.2 FL
MONOCYTES # BLD AUTO: 0.51 X10(3) UL (ref 0.1–1)
MONOCYTES NFR BLD AUTO: 8.7 %
MULTISTIX LOT#: NORMAL NUMERIC
NEUTROPHILS # BLD AUTO: 2.83 X10 (3) UL (ref 1.5–7.7)
NEUTROPHILS # BLD AUTO: 2.83 X10(3) UL (ref 1.5–7.7)
NEUTROPHILS NFR BLD AUTO: 48.2 %
NITRITE, URINE: NEGATIVE
NONHDLC SERPL-MCNC: 153 MG/DL (ref ?–130)
OCCULT BLOOD: NEGATIVE
OSMOLALITY SERPL CALC.SUM OF ELEC: 285 MOSM/KG (ref 275–295)
PH, URINE: 6.5 (ref 4.5–8)
PLATELET # BLD AUTO: 334 10(3)UL (ref 150–450)
POTASSIUM SERPL-SCNC: 3.9 MMOL/L (ref 3.5–5.1)
PROT SERPL-MCNC: 7.9 G/DL (ref 6.4–8.2)
PROTEIN (URINE DIPSTICK): NEGATIVE MG/DL
RBC # BLD AUTO: 5.37 X10(6)UL
SODIUM SERPL-SCNC: 137 MMOL/L (ref 136–145)
SPECIFIC GRAVITY: 1.02 (ref 1–1.03)
T4 FREE SERPL-MCNC: 0.9 NG/DL (ref 0.8–1.7)
TRIGL SERPL-MCNC: 249 MG/DL (ref 30–149)
TSI SER-ACNC: 3.72 MIU/ML (ref 0.36–3.74)
URINE-COLOR: YELLOW
UROBILINOGEN,SEMI-QN: 0.2 MG/DL (ref 0–1.9)
VIT D+METAB SERPL-MCNC: 33 NG/ML (ref 30–100)
VLDLC SERPL CALC-MCNC: 43 MG/DL (ref 0–30)
WBC # BLD AUTO: 5.9 X10(3) UL (ref 4–11)

## 2024-01-08 PROCEDURE — 82306 VITAMIN D 25 HYDROXY: CPT | Performed by: FAMILY MEDICINE

## 2024-01-08 PROCEDURE — 87491 CHLMYD TRACH DNA AMP PROBE: CPT | Performed by: FAMILY MEDICINE

## 2024-01-08 PROCEDURE — 87389 HIV-1 AG W/HIV-1&-2 AB AG IA: CPT | Performed by: FAMILY MEDICINE

## 2024-01-08 PROCEDURE — 87591 N.GONORRHOEAE DNA AMP PROB: CPT | Performed by: FAMILY MEDICINE

## 2024-01-08 PROCEDURE — 80061 LIPID PANEL: CPT | Performed by: FAMILY MEDICINE

## 2024-01-08 PROCEDURE — 80050 GENERAL HEALTH PANEL: CPT | Performed by: FAMILY MEDICINE

## 2024-01-08 PROCEDURE — 84439 ASSAY OF FREE THYROXINE: CPT | Performed by: FAMILY MEDICINE

## 2024-01-08 RX ORDER — AMOXICILLIN AND CLAVULANATE POTASSIUM 875; 125 MG/1; MG/1
TABLET, FILM COATED ORAL
COMMUNITY
Start: 2024-01-03

## 2024-01-08 RX ORDER — RIBOFLAVIN (VITAMIN B2) 100 MG
100 TABLET ORAL DAILY
COMMUNITY

## 2024-01-08 RX ORDER — METHYLPREDNISOLONE 4 MG/1
4 TABLET ORAL AS DIRECTED
COMMUNITY
Start: 2024-01-03

## 2024-01-08 RX ORDER — FLUCONAZOLE 150 MG/1
TABLET ORAL
COMMUNITY
Start: 2024-01-03

## 2024-01-08 RX ORDER — LEVOCETIRIZINE DIHYDROCHLORIDE 5 MG/1
5 TABLET, FILM COATED ORAL EVERY EVENING
COMMUNITY

## 2024-01-08 NOTE — H&P
CC: Annual Physical Exam    HPI:   Bernadette Duff is a 48 year old female who presents for a complete physical exam. Symptoms: periods are regular. Patient complains of nothing.     Wt Readings from Last 6 Encounters:   01/08/24 190 lb (86.2 kg)   09/05/19 150 lb (68 kg)   05/22/19 151 lb (68.5 kg)   06/21/18 140 lb (63.5 kg)   04/20/18 147 lb (66.7 kg)   02/28/18 154 lb (69.9 kg)     Body mass index is 33.13 kg/m².     Results for orders placed or performed in visit on 01/08/24   Urine Dip, auto without Micro   Result Value Ref Range    Glucose Urine Negative Negative mg/dL    Bilirubin Urine Negative Negative    Ketones, UA Negative Negative - Trace mg/dL    Spec Gravity 1.025 1.005 - 1.030    Blood Urine Negative Negative    PH Urine 6.5 5.0 - 8.0    Protein Urine Negative Negative - Trace mg/dL    Urobilinogen Urine 0.2 0.2 - 1.0 mg/dL    Nitrite Urine Negative Negative    Leukocyte Esterase Urine Negative Negative    APPEARANCE clear Clear    Color Urine yellow Yellow    Multistix Lot# [id number] Numeric    Multistix Expiration Date 05/31/2024 Date       Current Outpatient Medications   Medication Sig Dispense Refill    B Complex-C-Folic Acid Oral Tab Take by mouth.      Ascorbic Acid (VITAMIN C) 100 MG Oral Tab Take 1 tablet (100 mg total) by mouth daily.      amoxicillin clavulanate 875-125 MG Oral Tab TAKE 1 TABLET BY MOUTH TWICE DAILY      fluconazole 150 MG Oral Tab TAKE 1 TABLET BY MOUTH DAILY FOR DAY 1. REPEAT IN 3 DAYS IF NO RELIEF OF SYMPTOMS      methylPREDNISolone 4 MG Oral Tablet Therapy Pack Take 1 tablet (4 mg total) by mouth As Directed. FOLLOW DIRECTIONS ON PACKAGING      levocetirizine 5 MG Oral Tab Take 1 tablet (5 mg total) by mouth every evening.      fluticasone propionate (FLOVENT HFA) 110 MCG/ACT Inhalation Aerosol Inhale 1 puff into the lungs 2 (two) times daily. 12 g 11    albuterol 108 (90 Base) MCG/ACT Inhalation Aero Soln Inhale 1 puff into the lungs every 4 (four) hours as needed for  Wheezing. 18 g 11    Azelastine HCl 137 MCG/SPRAY Nasal Solution 1 spray by Nasal route 2 (two) times daily. 30 mL 11    ibuprofen 600 MG Oral Tab Take 1 tablet (600 mg total) by mouth every 6 (six) hours as needed for Pain.      Multiple Vitamin (MULTI-VITAMIN) Oral Tab Take 1 tablet by mouth daily.      Omega-3 Fatty Acids (FISH OIL OR) Take by mouth.      Fluticasone Propionate 50 MCG/ACT Nasal Suspension Use 2 sprays each nostril once daily after shower. Stop if you develop nose bleeds. 1 Inhaler 0    indomethacin 25 MG Oral Cap Take 2 capsules (50 mg total) by mouth 2 (two) times daily with meals. (Patient not taking: Reported on 2024)        Allergies   Allergen Reactions    Levaquin [Levofloxacin] JITTERY     Can't tolerate this one    Eggs Or Egg-Derived Products OTHER (SEE COMMENTS)     Allergy to egg whites noted on serum allergy testing    Shrimp OTHER (SEE COMMENTS)     Allergy noted on serum allergy testing      Past Medical History:   Diagnosis Date    Depression       Past Surgical History:   Procedure Laterality Date          D & C  2007    SINUS SURGERY          Family History   Problem Relation Age of Onset    Psychiatric Son         bipolar    Breast Cancer Maternal Grandmother 40    Cancer Maternal Grandmother         breast at age 40's    Breast Cancer Maternal Aunt 60    Cancer Maternal Aunt         breast in her 60's    Psychiatric Mother         bipolar    Cancer Maternal Grandfather     Cancer Paternal Grandmother     Cancer Paternal Grandfather     Heart Disease Neg     Stroke Neg       Social History:   Social History     Socioeconomic History    Marital status:    Tobacco Use    Smoking status: Never    Smokeless tobacco: Never   Substance and Sexual Activity    Alcohol use: Yes     Alcohol/week: 1.0 standard drink of alcohol     Types: 1 Glasses of wine per week     Comment: 1q week    Drug use: No   Other Topics Concern    Caffeine Concern No     Comment:  off on    Exercise No     Comment: not lately     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   Exercise:  5 times per week.  Diet: watches fats closely, watches sugar closely and watches calories closely     REVIEW OF SYSTEMS:   GENERAL: feels well otherwise  SKIN: denies any unusual skin lesions  EYES:denies blurred vision or double vision  HEENT: denies nasal congestion, sinus pain or ST  LUNGS: denies shortness of breath with exertion  CARDIOVASCULAR: denies chest pain on exertion  GI: denies abdominal pain,denies heartburn  : denies dysuria, vaginal discharge or itching,periods regular   MUSCULOSKELETAL: denies back pain  NEURO: denies headaches  PSYCHE: denies depression or anxiety  HEMATOLOGIC: denies hx of anemia  ENDOCRINE: denies thyroid history  ALL/ASTHMA: denies hx of allergy or asthma    EXAM:   /74 (BP Location: Left arm, Patient Position: Sitting, Cuff Size: adult)   Pulse 100   Temp 98.1 °F (36.7 °C) (Temporal)   Resp 16   Ht 5' 3.5\" (1.613 m)   Wt 190 lb (86.2 kg)   LMP 01/01/2024 (Exact Date)   SpO2 100%   BMI 33.13 kg/m²   Body mass index is 33.13 kg/m².   GENERAL: well developed, well nourished,in no apparent distress  SKIN: no rashes,no suspicious lesions  HEENT: atraumatic, normocephalic,ears and throat are clear  EYES: EOMI,conjunctiva are clear  NECK: supple,no adenopathy,no bruits; no thyromegaly  CHEST: no chest tenderness  BREAST: no dominant or suspicious mass, no nipple discharge; fibrocystic  LUNGS: clear to auscultation  CARDIO: RRR without murmur  GI: good BS's,no masses, HSM or tenderness  :introitus is normal,scant discharge,cervix is pink,no adnexal masses or tenderness, PAP was done  MUSCULOSKELETAL: back is not tender,FROM of the back  EXTREMITIES: no cyanosis, clubbing or edema  NEURO: Oriented times three,cranial nerves are intact,motor and sensory are grossly intact    ASSESSMENT AND PLAN:   Bernadette Duff is a 48 year old female who presents  for a complete physical exam.   Pap and pelvic done.   Order put in for mammogram.    Self breast exam explained.   Health maintenance, will check fasting Lipids, CMP, CBC, TSH c ref. , vitamin D    Last eye exam -- 1/2023  Last dental exam -- 6/2023  Advised Hep B  series since not immune.  Advised to lose 10 lbs in 3 months.      Pt' s weight is Body mass index is 33.13 kg/m²., recommended low fat diet and aerobic exercise 30 minutes three times weekly.    The patient indicates understanding of these issues and agrees to the plan.  The patient is asked to return: Return in about 3 months (around 4/8/2024) for weight check.

## 2024-01-09 LAB
C TRACH DNA SPEC QL NAA+PROBE: NEGATIVE
N GONORRHOEA DNA SPEC QL NAA+PROBE: NEGATIVE

## 2024-01-12 LAB
.: NORMAL
.: NORMAL

## 2024-01-30 ENCOUNTER — HOSPITAL ENCOUNTER (OUTPATIENT)
Dept: MAMMOGRAPHY | Age: 49
Discharge: HOME | End: 2024-01-30
Attending: FAMILY MEDICINE
Payer: COMMERCIAL

## 2024-01-30 DIAGNOSIS — Z12.31 SCREENING MAMMOGRAM FOR BREAST CANCER: ICD-10-CM

## 2024-01-30 PROCEDURE — 77063 BREAST TOMOSYNTHESIS BI: CPT | Performed by: FAMILY MEDICINE

## 2024-01-30 PROCEDURE — 77067 SCR MAMMO BI INCL CAD: CPT | Performed by: FAMILY MEDICINE

## 2024-02-13 ENCOUNTER — HOSPITAL ENCOUNTER (OUTPATIENT)
Dept: MAMMOGRAPHY | Age: 49
Discharge: HOME | End: 2024-02-13
Attending: FAMILY MEDICINE
Payer: COMMERCIAL

## 2024-02-13 ENCOUNTER — HOSPITAL ENCOUNTER (OUTPATIENT)
Dept: ULTRASOUND IMAGING | Age: 49
Discharge: HOME | End: 2024-02-13
Attending: FAMILY MEDICINE
Payer: COMMERCIAL

## 2024-02-13 DIAGNOSIS — R92.2 INCONCLUSIVE MAMMOGRAM: ICD-10-CM

## 2024-02-13 PROCEDURE — 76642 ULTRASOUND BREAST LIMITED: CPT | Performed by: FAMILY MEDICINE

## 2024-02-13 PROCEDURE — 77062 BREAST TOMOSYNTHESIS BI: CPT | Performed by: FAMILY MEDICINE

## 2024-02-13 PROCEDURE — 77066 DX MAMMO INCL CAD BI: CPT | Performed by: FAMILY MEDICINE

## 2024-04-03 ENCOUNTER — TELEPHONE (OUTPATIENT)
Dept: FAMILY MEDICINE CLINIC | Facility: CLINIC | Age: 49
End: 2024-04-03

## 2024-04-03 NOTE — TELEPHONE ENCOUNTER
Vitamin D was not covered by insurance. Sent to RN to revise order to include E55 to be added to order and print and return back to me for resubmission.    Sent to Sydnee to change.    Will Zeniat patient.

## 2024-05-07 ENCOUNTER — TELEPHONE (OUTPATIENT)
Dept: FAMILY MEDICINE CLINIC | Facility: CLINIC | Age: 49
End: 2024-05-07

## 2024-05-07 DIAGNOSIS — E06.3 AUTOIMMUNE HYPOTHYROIDISM: Primary | ICD-10-CM

## 2024-05-07 RX ORDER — LEVOTHYROXINE SODIUM 0.03 MG/1
25 TABLET ORAL
Qty: 90 TABLET | Refills: 0 | Status: SHIPPED | OUTPATIENT
Start: 2024-05-07

## 2024-05-07 NOTE — TELEPHONE ENCOUNTER
Per OV with Dr Clifton 4/29/24: \" Start levothyroxine 25 mcg daily and recheck labs in 6-8 weeks\".  Rx sent to pharmacy, pt notified by phone an she will check thyroid labs in 6 weeks.

## 2024-05-07 NOTE — TELEPHONE ENCOUNTER
Patient had an office visit 4/29.     Patient states she was to start a medication for hypothyroidism. She checked with the pharmacy and they do not have a prescription for it. Patient unsure of the name.    [id number]  [place], [place] - [address] AT [facility] [address], [phone], [phone]

## 2024-05-20 ENCOUNTER — TELEPHONE (OUTPATIENT)
Dept: FAMILY MEDICINE CLINIC | Facility: CLINIC | Age: 49
End: 2024-05-20

## 2024-05-20 NOTE — TELEPHONE ENCOUNTER
Via [employer], checking to see if her billing is correct now concerning her lab order. On our end it looks good but I called just to make sure.

## 2024-08-13 ENCOUNTER — OFFICE VISIT (OUTPATIENT)
Dept: FAMILY MEDICINE CLINIC | Facility: CLINIC | Age: 49
End: 2024-08-13
Payer: COMMERCIAL

## 2024-08-13 ENCOUNTER — TELEPHONE (OUTPATIENT)
Dept: FAMILY MEDICINE CLINIC | Facility: CLINIC | Age: 49
End: 2024-08-13

## 2024-08-13 VITALS
BODY MASS INDEX: 33 KG/M2 | HEIGHT: 63.5 IN | HEART RATE: 86 BPM | OXYGEN SATURATION: 98 % | DIASTOLIC BLOOD PRESSURE: 70 MMHG | SYSTOLIC BLOOD PRESSURE: 118 MMHG | RESPIRATION RATE: 16 BRPM

## 2024-08-13 DIAGNOSIS — E66.09 CLASS 1 OBESITY DUE TO EXCESS CALORIES WITHOUT SERIOUS COMORBIDITY WITH BODY MASS INDEX (BMI) OF 32.0 TO 32.9 IN ADULT: ICD-10-CM

## 2024-08-13 DIAGNOSIS — Z79.899 MEDICATION MANAGEMENT: ICD-10-CM

## 2024-08-13 DIAGNOSIS — Z23 NEED FOR VACCINATION: ICD-10-CM

## 2024-08-13 DIAGNOSIS — Z71.85 VACCINE COUNSELING: ICD-10-CM

## 2024-08-13 DIAGNOSIS — R23.8 PAPULE: Primary | ICD-10-CM

## 2024-08-13 PROCEDURE — 88304 TISSUE EXAM BY PATHOLOGIST: CPT | Performed by: FAMILY MEDICINE

## 2024-08-13 PROCEDURE — 99214 OFFICE O/P EST MOD 30 MIN: CPT | Performed by: FAMILY MEDICINE

## 2024-08-13 PROCEDURE — 88305 TISSUE EXAM BY PATHOLOGIST: CPT | Performed by: FAMILY MEDICINE

## 2024-08-13 PROCEDURE — 11302 SHAVE SKIN LESION 1.1-2.0 CM: CPT | Performed by: FAMILY MEDICINE

## 2024-08-13 NOTE — PROGRESS NOTES
Bernadette Duff is a 49 year old female.  HPI:   Patient is here to remove papule on the inner proximal medial thigh of her left leg.  Patient states she has had the lesion for a long time.  Patient is very interested in trying weight loss shot like Wegovy.  Patient is aware she is due for blood work.    Current Outpatient Medications   Medication Sig Dispense Refill    fexofenadine 180 MG Oral Tab TAKE 1 TABLET BY MOUTH DAILY. SWALLOW WHOLE WITH WATER. DO NOT TAKE WITH FRUIT JUICES      FLUoxetine 20 MG Oral Cap Take 1 capsule (20 mg total) by mouth daily. 30 capsule 2    levothyroxine 25 MCG Oral Tab Take 1 tablet (25 mcg total) by mouth before breakfast. 90 tablet 0    cetirizine 10 MG Oral Tab Take 1 tablet (10 mg total) by mouth daily.      ALPRAZolam (XANAX) 0.25 MG Oral Tab Take 1 tablet (0.25 mg total) by mouth daily as needed. 10 tablet 1    fluconazole 150 MG Oral Tab TAKE 1 TABLET BY MOUTH DAILY FOR DAY 1. REPEAT IN 3 DAYS IF NO RELIEF OF SYMPTOMS (Patient not taking: Reported on 4/29/2024)      levocetirizine 5 MG Oral Tab Take 1 tablet (5 mg total) by mouth every evening.      fluticasone propionate (FLOVENT HFA) 110 MCG/ACT Inhalation Aerosol Inhale 1 puff into the lungs 2 (two) times daily. 12 g 11    albuterol 108 (90 Base) MCG/ACT Inhalation Aero Soln Inhale 1 puff into the lungs every 4 (four) hours as needed for Wheezing. 18 g 11    Azelastine HCl 137 MCG/SPRAY Nasal Solution 1 spray by Nasal route 2 (two) times daily. 30 mL 11    ibuprofen 600 MG Oral Tab Take 1 tablet (600 mg total) by mouth every 6 (six) hours as needed for Pain.      Fluticasone Propionate 50 MCG/ACT Nasal Suspension Use 2 sprays each nostril once daily after shower. Stop if you develop nose bleeds. 1 Inhaler 0     No current facility-administered medications for this visit.      Allergies   Allergen Reactions    Levaquin [Levofloxacin] JITTERY     Can't tolerate this one    Dander UNKNOWN    Eggs Or Egg-Derived Products OTHER  (SEE COMMENTS)     Allergy to egg whites noted on serum allergy testing    Grass UNKNOWN    Shrimp OTHER (SEE COMMENTS)     Allergy noted on serum allergy testing    Levothyroxine DIARRHEA      Past Medical History:    Depression      Social History:  Social History     Socioeconomic History    Marital status:    Tobacco Use    Smoking status: Never    Smokeless tobacco: Never   Substance and Sexual Activity    Alcohol use: Yes     Alcohol/week: 1.0 standard drink of alcohol     Types: 1 Glasses of wine per week     Comment: 1q week    Drug use: No   Other Topics Concern    Caffeine Concern No     Comment: off on    Exercise No     Comment: not lately        Results for orders placed or performed in visit on 08/13/24   Specimen to Pathology, Tissue   Result Value Ref Range    Case Report       Surgical Pathology                                Case: [id number]                                   Authorizing Provider:  Sophie Clifton DO          Collected:           08/13/2024 04:23 PM          Ordering Location:     [facility]    Received:            08/13/2024 04:23 PM                                 [facility]                                                                   Pathologist:           Jesus Worrell MD                                                             Specimen:    Thigh, left, Left thigh                                                                    Final Diagnosis:       Skin, left thigh, excision:  -Acrochordon.      Embedded Images      Clinical Information       R23.8 Papule.         Gross Description       A. Labeled with the patient's name, MRN, and \"left thigh\"  Received in formalin: An unoriented rubbery, granular, brown-tan skin fragment measuring 1.4 x 1.0 x 0.8 cm.  The specimen is inked blue along the resection margin, trisected and submitted entirely in  A1.    ([id number])         REVIEW OF SYSTEMS:   GENERAL: feels well otherwise  SKIN: denies any unusual skin lesions; papule  LUNGS: denies shortness of breath with exertion  CARDIOVASCULAR: denies chest pain on exertion  GI: denies abdominal pain,denies heartburn  MUSCULOSKELETAL: denies back pain  EXTREMITIES:  No pain or numbness    EXAM:   /70 (BP Location: Left arm, Patient Position: Sitting, Cuff Size: adult)   Pulse 86   Resp 16   Ht 5' 3.5\" (1.613 m)   LMP 08/05/2024 (Exact Date)   SpO2 98%   BMI 32.63 kg/m²   GENERAL: well developed, well nourished,in no apparent distress  SKIN: no rashes,no suspicious lesions  Area with papule on her left proximal medial thigh cleaned with alcohol.  Numbed with 1% Xylocaine with epi-2 cc.  Area cleaned with Betadine.  Lesion which she has a papule with approximately 1 point centimeter base shaved.  Silver nitrate stopped bleeding.  No complication.  Bacitracin placed.  Band-Aid placed.  HEENT: atraumatic, normocephalic  NECK: supple,no adenopathy,no bruits  LUNGS: clear to auscultation  CARDIO: RRR without murmur  GI: soft, good BS's  EXTREMITIES: no cyanosis, clubbing or edema    ASSESSMENT AND PLAN:     Encounter Diagnoses   Name Primary?    Papule Yes    Class 1 obesity due to excess calories without serious comorbidity with body mass index (BMI) of 32.0 to 32.9 in adult     Medication management     Need for vaccination     Vaccine counseling        Orders Placed This Encounter   Procedures    Zoster Recombinant Adjuvanted (Shingrix -Shingles) [60610]    Specimen to Pathology, Tissue       Meds & Refills for this Visit:  Requested Prescriptions     Signed Prescriptions Disp Refills    semaglutide-weight management 0.25 MG/0.5ML Subcutaneous Solution Auto-injector 2 mL 0     Sig: Inject 0.5 mL (0.25 mg total) into the skin once a week for 4 doses.       Imaging & Consults:  ZOSTER VACC RECOMBINANT IM NJX    Papule removed and sent for pathology today.  Will send  Wegovy to see if it is covered by her insurance.  Advise blood work before starting weight loss.  Advised to set up with monthly appointments for the weight loss clinic with me.  Discussed hepatitis B vaccine series since patient is not immune and was checked in 2019 and it was not reactive.  Also discussed the shingles vaccine which we will give it another time since she has a work get away this weekend.  Advised to monitor for infection at the spot but the lesion was removed today.  Pt. Responded via my chart that she does not have any family history of thyroid cancer.    The patient indicates understanding of these issues and agrees to the plan.  Return in about 4 weeks (around 9/10/2024) for OM -15.

## 2024-08-15 ENCOUNTER — TELEPHONE (OUTPATIENT)
Dept: FAMILY MEDICINE CLINIC | Facility: CLINIC | Age: 49
End: 2024-08-15

## 2024-08-15 NOTE — TELEPHONE ENCOUNTER
I called Louisa to resubmit with new codes for the DOS: 01/08/2024. I will keep following tu to see when insurance approves and denies     [facility] and follow up with Louisa.

## 2024-08-16 ENCOUNTER — PATIENT MESSAGE (OUTPATIENT)
Dept: FAMILY MEDICINE CLINIC | Facility: CLINIC | Age: 49
End: 2024-08-16

## 2024-08-16 NOTE — TELEPHONE ENCOUNTER
From: Bernadette Duff  To: Sophie Clifton  Sent: 8/16/2024 6:16 AM CDT  Subject: Response about family history     Lisha Webster and Dr Clifton,   I got your voice message and wanted to respond that I have no family history of thyroid cancer.   Thank you!

## 2024-08-22 ENCOUNTER — LAB ENCOUNTER (OUTPATIENT)
Dept: LAB | Age: 49
End: 2024-08-22
Attending: FAMILY MEDICINE
Payer: COMMERCIAL

## 2024-08-22 DIAGNOSIS — E66.09 CLASS 1 OBESITY DUE TO EXCESS CALORIES WITHOUT SERIOUS COMORBIDITY WITH BODY MASS INDEX (BMI) OF 32.0 TO 32.9 IN ADULT: ICD-10-CM

## 2024-08-22 DIAGNOSIS — E55.9 VITAMIN D DEFICIENCY DISEASE: ICD-10-CM

## 2024-08-22 DIAGNOSIS — E06.3 AUTOIMMUNE HYPOTHYROIDISM: ICD-10-CM

## 2024-08-22 DIAGNOSIS — F43.9 STRESS: ICD-10-CM

## 2024-08-22 LAB
CORTIS SERPL-MCNC: 17.2 UG/DL
INSULIN SERPL-ACNC: 24.6 MU/L (ref 3–25)
T4 FREE SERPL-MCNC: 1.1 NG/DL (ref 0.8–1.7)
TSI SER-ACNC: 3.33 MIU/ML (ref 0.55–4.78)
VIT D+METAB SERPL-MCNC: 33.5 NG/ML (ref 30–100)

## 2024-08-22 PROCEDURE — 82306 VITAMIN D 25 HYDROXY: CPT | Performed by: FAMILY MEDICINE

## 2024-08-22 PROCEDURE — 84443 ASSAY THYROID STIM HORMONE: CPT | Performed by: FAMILY MEDICINE

## 2024-08-22 PROCEDURE — 84439 ASSAY OF FREE THYROXINE: CPT | Performed by: FAMILY MEDICINE

## 2024-08-22 PROCEDURE — 82533 TOTAL CORTISOL: CPT | Performed by: FAMILY MEDICINE

## 2024-08-22 PROCEDURE — 83520 IMMUNOASSAY QUANT NOS NONAB: CPT | Performed by: FAMILY MEDICINE

## 2024-08-22 PROCEDURE — 83525 ASSAY OF INSULIN: CPT | Performed by: FAMILY MEDICINE

## 2024-08-22 NOTE — PROGRESS NOTES
Dear Bernadette,    Your blood work is stable.  Your leptin level is pending,     Sincerely,  Dr. Clifton

## 2024-08-26 LAB — LEPTIN: 41.1 NG/ML

## 2024-09-03 ENCOUNTER — TELEPHONE (OUTPATIENT)
Dept: FAMILY MEDICINE CLINIC | Facility: CLINIC | Age: 49
End: 2024-09-03

## 2024-09-03 NOTE — TELEPHONE ENCOUNTER
Patient is calling that her semaglutide-weight management 0.25 MG/0.5ML Subcutaneous Solution Auto-injector is being denied for not having three months of weight loss clinic and she is wondering if we can resubmit as she started in april

## 2024-11-04 ENCOUNTER — PATIENT MESSAGE (OUTPATIENT)
Dept: FAMILY MEDICINE CLINIC | Facility: CLINIC | Age: 49
End: 2024-11-04

## 2024-11-04 DIAGNOSIS — E66.09 CLASS 1 OBESITY DUE TO EXCESS CALORIES WITHOUT SERIOUS COMORBIDITY WITH BODY MASS INDEX (BMI) OF 32.0 TO 32.9 IN ADULT: ICD-10-CM

## 2024-11-04 DIAGNOSIS — E66.811 CLASS 1 OBESITY DUE TO EXCESS CALORIES WITHOUT SERIOUS COMORBIDITY WITH BODY MASS INDEX (BMI) OF 32.0 TO 32.9 IN ADULT: ICD-10-CM

## 2024-11-04 NOTE — TELEPHONE ENCOUNTER
LOV- 8/13/24  NOV- 11/15/24    Last semaglutide rx was ordered 8/13/24 but dispensed 10/7/24  Pt requesting refill, will be due prior to next appt

## 2024-11-13 RX ORDER — CLINDAMYCIN PHOSPHATE 10 MG/G
1 GEL TOPICAL 2 TIMES DAILY PRN
COMMUNITY
Start: 2024-10-08

## 2024-11-13 RX ORDER — DOXYCYCLINE 100 MG/1
CAPSULE ORAL
COMMUNITY
Start: 2024-10-08

## 2024-11-15 ENCOUNTER — OFFICE VISIT (OUTPATIENT)
Dept: FAMILY MEDICINE CLINIC | Facility: CLINIC | Age: 49
End: 2024-11-15
Payer: COMMERCIAL

## 2024-11-15 VITALS
SYSTOLIC BLOOD PRESSURE: 118 MMHG | BODY MASS INDEX: 33.97 KG/M2 | HEART RATE: 90 BPM | DIASTOLIC BLOOD PRESSURE: 66 MMHG | RESPIRATION RATE: 16 BRPM | WEIGHT: 194.13 LBS | HEIGHT: 63.5 IN | OXYGEN SATURATION: 95 %

## 2024-11-15 DIAGNOSIS — Z71.82 EXERCISE COUNSELING: ICD-10-CM

## 2024-11-15 DIAGNOSIS — Z23 NEED FOR VACCINATION: ICD-10-CM

## 2024-11-15 DIAGNOSIS — E66.811 CLASS 1 OBESITY DUE TO EXCESS CALORIES WITHOUT SERIOUS COMORBIDITY WITH BODY MASS INDEX (BMI) OF 33.0 TO 33.9 IN ADULT: Primary | ICD-10-CM

## 2024-11-15 DIAGNOSIS — Z71.3 DIETARY COUNSELING: ICD-10-CM

## 2024-11-15 DIAGNOSIS — E66.09 CLASS 1 OBESITY DUE TO EXCESS CALORIES WITHOUT SERIOUS COMORBIDITY WITH BODY MASS INDEX (BMI) OF 33.0 TO 33.9 IN ADULT: Primary | ICD-10-CM

## 2024-11-15 DIAGNOSIS — Z79.899 MEDICATION MANAGEMENT: ICD-10-CM

## 2024-11-15 DIAGNOSIS — F33.1 MODERATE RECURRENT MAJOR DEPRESSION (HCC): ICD-10-CM

## 2024-11-15 DIAGNOSIS — Z71.85 VACCINE COUNSELING: ICD-10-CM

## 2024-11-15 DIAGNOSIS — Z12.31 SCREENING MAMMOGRAM FOR BREAST CANCER: ICD-10-CM

## 2024-11-15 DIAGNOSIS — F41.1 GAD (GENERALIZED ANXIETY DISORDER): ICD-10-CM

## 2024-11-15 PROCEDURE — 90471 IMMUNIZATION ADMIN: CPT | Performed by: FAMILY MEDICINE

## 2024-11-15 PROCEDURE — 99214 OFFICE O/P EST MOD 30 MIN: CPT | Performed by: FAMILY MEDICINE

## 2024-11-15 PROCEDURE — 3078F DIAST BP <80 MM HG: CPT | Performed by: FAMILY MEDICINE

## 2024-11-15 PROCEDURE — 3074F SYST BP LT 130 MM HG: CPT | Performed by: FAMILY MEDICINE

## 2024-11-15 PROCEDURE — 3008F BODY MASS INDEX DOCD: CPT | Performed by: FAMILY MEDICINE

## 2024-11-15 PROCEDURE — 90746 HEPB VACCINE 3 DOSE ADULT IM: CPT | Performed by: FAMILY MEDICINE

## 2024-11-15 RX ORDER — BUPROPION HYDROCHLORIDE 150 MG/1
150 TABLET ORAL DAILY
Qty: 30 TABLET | Refills: 1 | Status: SHIPPED | OUTPATIENT
Start: 2024-11-15

## 2024-11-15 RX ORDER — ALPRAZOLAM 0.25 MG/1
0.25 TABLET ORAL
Qty: 30 TABLET | Refills: 1 | Status: SHIPPED | OUTPATIENT
Start: 2024-11-15

## 2024-11-15 NOTE — PROGRESS NOTES
[facility] [address] [address]  Dept: [phone]       Patient:  Bernadette Duff  :      1975  MRN:      [id number]    Chief Complaint:    Chief Complaint   Patient presents with    Weight Check    Medication Follow-Up     Would like to discuss Wegovy and anxiety meds        SUBJECTIVE     History of Present Illness:  Bernadette is being seen today for a follow-up for weight.  Doing full time work and PHD  and very sedentary.  She is eating less and not moving at all.  Speaking to therapist once a week.  No suicidal thoughts.  Notes more anxiety than depression.  Does not find interest in things.  Fluoxetine did not help her this year.  Therapist suggested what she feels was lexapro but not sure.  Wellbutrin helped her in the past.    PHQ 11  NIKOLAY 12      Past Medical History:   Past Medical History:    Depression          OBJECTIVE     Vitals: /66 (BP Location: Left arm, Patient Position: Sitting, Cuff Size: adult)   Pulse 90   Resp 16   Ht 5' 3.5\" (1.613 m)   Wt 194 lb 2 oz (88.1 kg)   LMP 10/24/2024 (Exact Date)   SpO2 95%   BMI 33.85 kg/m²     Initial weight loss: -7   Total weight loss: -8   Start weight: 186    Wt Readings from Last 3 Encounters:   11/15/24 194 lb 2 oz (88.1 kg)   24 187 lb 2 oz (84.9 kg)   24 186 lb (84.4 kg)       Patient Medications:    Current Outpatient Medications   Medication Sig Dispense Refill    semaglutide-weight management 0.5 MG/0.5ML Subcutaneous Solution Auto-injector Inject 0.5 mL (0.5 mg total) into the skin once a week for 4 doses. 2 mL 0    buPROPion  MG Oral Tablet 24 Hr Take 1 tablet (150 mg total) by mouth daily. 30 tablet 1    ALPRAZolam (XANAX) 0.25 MG Oral Tab Take 1 tablet (0.25 mg total) by mouth daily as needed. 30 tablet 1    doxycycline 100 MG Oral Cap TAKE ONE CAPSULE BY MOUTH TWICE DAILY WITH FOOD AND WATER. NEVER ON EMPTY STOMACH. BE  CAREFUL WITH SUN      Clindamycin Phosphate 1 % External Gel Apply 1 Application topically 2 (two) times daily as needed.      cetirizine 10 MG Oral Tab Take 1 tablet (10 mg total) by mouth daily.      ALPRAZolam (XANAX) 0.25 MG Oral Tab Take 1 tablet (0.25 mg total) by mouth daily as needed. 10 tablet 1    fluticasone propionate (FLOVENT HFA) 110 MCG/ACT Inhalation Aerosol Inhale 1 puff into the lungs 2 (two) times daily. 12 g 11    albuterol 108 (90 Base) MCG/ACT Inhalation Aero Soln Inhale 1 puff into the lungs every 4 (four) hours as needed for Wheezing. 18 g 11    Azelastine HCl 137 MCG/SPRAY Nasal Solution 1 spray by Nasal route 2 (two) times daily. 30 mL 11    ibuprofen 600 MG Oral Tab Take 1 tablet (600 mg total) by mouth every 6 (six) hours as needed for Pain.      Fluticasone Propionate 50 MCG/ACT Nasal Suspension Use 2 sprays each nostril once daily after shower. Stop if you develop nose bleeds. 1 Inhaler 0     Allergies:  Levaquin [levofloxacin], Dander, Eggs or egg-derived products, Grass, Shrimp, and Levothyroxine     Social History:    Social History     Socioeconomic History    Marital status:      Spouse name: Not on file    Number of children: Not on file    Years of education: Not on file    Highest education level: Not on file   Occupational History    Not on file   Tobacco Use    Smoking status: Never    Smokeless tobacco: Never   Substance and Sexual Activity    Alcohol use: Yes     Alcohol/week: 1.0 standard drink of alcohol     Types: 1 Glasses of wine per week     Comment: 1q week    Drug use: No    Sexual activity: Not on file   Other Topics Concern     Service Not Asked    Blood Transfusions Not Asked    Caffeine Concern No     Comment: off on    Occupational Exposure Not Asked    Hobby Hazards Not Asked    Sleep Concern Not Asked    Stress Concern Not Asked    Weight Concern Not Asked    Special Diet Not Asked    Back Care Not Asked    Exercise No     Comment: not lately     Bike Helmet Not Asked    Seat Belt Not Asked    Self-Exams Not Asked   Social History Narrative    Not on file     Social Drivers of Health     Financial Resource Strain: Not on file   Food Insecurity: Not on file   Transportation Needs: Not on file   Physical Activity: Not on file   Stress: Not on file   Social Connections: Not on file   Housing Stability: Not on file     Surgical History:    Past Surgical History:   Procedure Laterality Date          D & c  2007    Sinus surgery         Family History:    Family History   Problem Relation Age of Onset    Psychiatric Son         bipolar    Breast Cancer Maternal Grandmother 40    Cancer Maternal Grandmother         breast at age 40's    Breast Cancer Maternal Aunt 60    Cancer Maternal Aunt         breast in her 60's    Psychiatric Mother         bipolar    Cancer Maternal Grandfather     Cancer Paternal Grandmother     Cancer Paternal Grandfather     Heart Disease Neg     Stroke Neg      Food Journal  Reviewed and Discussed:          Patient has a Food Journal?:  no   Patient is reading nutrition labels?  yes, most of the time.   Average Caloric Intake:          unsure  Average CHO Intake:  unsure  Is patient exercising?  no    Eating Habits  Patient states the following:  Eats 3 meal(s) per day  Length of time it takes to consume a meal:  10 mins  # of snacks per day: 0         Type of snacks: n/a  Amount of soda consumption per day:  0  Amount of water (in ounces) per day:  32 oz  Drinking between meals only:  no  Toughest challenge: Trying to get more movement in with a busy schedule and sitting a lot during the day            ROS:    Pertinent items are noted in HPI.  A comprehensive review of systems was negative.  All other systems were reviewed and are negative    Physical Exam:   /66 (BP Location: Left arm, Patient Position: Sitting, Cuff Size: adult)   Pulse 90   Resp 16   Ht 5' 3.5\" (1.613 m)   Wt 194 lb 2 oz (88.1 kg)    LMP 10/24/2024 (Exact Date)   SpO2 95%   BMI 33.85 kg/m²   General appearance: alert, appears stated age, and cooperative  Head: Normocephalic, without obvious abnormality, atraumatic  Neck: no adenopathy, no carotid bruit, no JVD, supple, symmetrical, trachea midline, and thyroid not enlarged, symmetric, no tenderness/mass/nodules  Lungs: clear to auscultation bilaterally  Heart: S1, S2 normal, no murmur, click, rub or gallop, regular rate and rhythm  Abdomen: soft, non-tender; bowel sounds normal; no masses,  no organomegaly  Extremities: extremities normal, atraumatic, no cyanosis or edema  Skin: Skin color, texture, turgor normal. No rashes or lesions    ASSESSMENT/PLAN     Encounter Diagnoses   Name Primary?    Class 1 obesity due to excess calories without serious comorbidity with body mass index (BMI) of 33.0 to 33.9 in adult Yes    Exercise counseling     Dietary counseling     Medication management     Screening mammogram for breast cancer     Need for vaccination     Vaccine counseling     Moderate recurrent major depression (HCC)     NIKOLAY (generalized anxiety disorder)        Orders Placed This Encounter   Procedures    Hep B, Adult [26407]       Nutritional Goals  Other:  food journal ; deferred dietician    Behavior Modifications Reviewed and Discussed  Drink 64 oz of water per day, Maintain a daily food journal, Identify triggers for eating and manage cues, and Eat slowly and take 20 to 30 minutes to complete each meal    Exercise Goals Reviewed and Discussed    Walk for 30 minutes daily      Meds & Refills for this Visit:  Requested Prescriptions     Signed Prescriptions Disp Refills    semaglutide-weight management 0.5 MG/0.5ML Subcutaneous Solution Auto-injector 2 mL 0     Sig: Inject 0.5 mL (0.5 mg total) into the skin once a week for 4 doses.    buPROPion  MG Oral Tablet 24 Hr 30 tablet 1     Sig: Take 1 tablet (150 mg total) by mouth daily.    ALPRAZolam (XANAX) 0.25 MG Oral Tab 30 tablet  1     Sig: Take 1 tablet (0.25 mg total) by mouth daily as needed.       Imaging & Consults:  HEPATITIS B VACCINE, OVER 20  ALEXANDER BENIGNO 2D+3D SCREENING BILAT (CPT=77067/15888)    Given Hep B #2 today.  Mammo ordered.  Start wellbutrin  mg daily.  Will find out name of SSRI? That therapist was suggesting.  Cont. Weekly therapy.  Xanax prn.  Given Hep B # 2 today.  Increase wegovy to 0.5 mg weekly.        Return in about 4 weeks (around 12/13/2024) for OM-30.

## 2024-11-15 NOTE — PROGRESS NOTES
Food Journal  Reviewed and Discussed:          Patient has a Food Journal?:  no   Patient is reading nutrition labels?  yes, most of the time.   Average Caloric Intake:          unsure  Average CHO Intake:  unsure  Is patient exercising?  no    Eating Habits  Patient states the following:  Eats 3 meal(s) per day  Length of time it takes to consume a meal:  10 mins  # of snacks per day: 0         Type of snacks: n/a  Amount of soda consumption per day:  0  Amount of water (in ounces) per day:  32 oz  Drinking between meals only:  no  Toughest challenge: Trying to get more movement in with a busy schedule and sitting a lot during the day

## 2024-11-21 ENCOUNTER — TELEPHONE (OUTPATIENT)
Dept: INTERNAL MEDICINE CLINIC | Facility: CLINIC | Age: 49
End: 2024-11-21

## 2024-12-09 RX ORDER — TAMSULOSIN HYDROCHLORIDE 0.4 MG/1
0.4 CAPSULE ORAL
COMMUNITY
Start: 2024-11-20

## 2024-12-09 RX ORDER — HYDROCODONE BITARTRATE AND ACETAMINOPHEN 5; 325 MG/1; MG/1
1 TABLET ORAL
COMMUNITY
Start: 2024-11-20

## 2024-12-10 ENCOUNTER — OFFICE VISIT (OUTPATIENT)
Dept: FAMILY MEDICINE CLINIC | Facility: CLINIC | Age: 49
End: 2024-12-10
Payer: COMMERCIAL

## 2024-12-10 VITALS
OXYGEN SATURATION: 98 % | RESPIRATION RATE: 16 BRPM | HEART RATE: 96 BPM | SYSTOLIC BLOOD PRESSURE: 110 MMHG | BODY MASS INDEX: 33.45 KG/M2 | WEIGHT: 191.13 LBS | DIASTOLIC BLOOD PRESSURE: 66 MMHG | HEIGHT: 63.5 IN

## 2024-12-10 DIAGNOSIS — Z71.82 EXERCISE COUNSELING: ICD-10-CM

## 2024-12-10 DIAGNOSIS — L02.92 BOILS: ICD-10-CM

## 2024-12-10 DIAGNOSIS — Z87.09 HISTORY OF INFLUENZA: ICD-10-CM

## 2024-12-10 DIAGNOSIS — E66.811 CLASS 1 OBESITY DUE TO EXCESS CALORIES WITHOUT SERIOUS COMORBIDITY WITH BODY MASS INDEX (BMI) OF 33.0 TO 33.9 IN ADULT: Primary | ICD-10-CM

## 2024-12-10 DIAGNOSIS — Z87.442 HISTORY OF KIDNEY STONES: ICD-10-CM

## 2024-12-10 DIAGNOSIS — E66.09 CLASS 1 OBESITY DUE TO EXCESS CALORIES WITHOUT SERIOUS COMORBIDITY WITH BODY MASS INDEX (BMI) OF 33.0 TO 33.9 IN ADULT: Primary | ICD-10-CM

## 2024-12-10 DIAGNOSIS — Z71.3 DIETARY COUNSELING: ICD-10-CM

## 2024-12-10 DIAGNOSIS — R91.1 PULMONARY NODULE: ICD-10-CM

## 2024-12-10 PROCEDURE — 3074F SYST BP LT 130 MM HG: CPT | Performed by: FAMILY MEDICINE

## 2024-12-10 PROCEDURE — 3008F BODY MASS INDEX DOCD: CPT | Performed by: FAMILY MEDICINE

## 2024-12-10 PROCEDURE — 3078F DIAST BP <80 MM HG: CPT | Performed by: FAMILY MEDICINE

## 2024-12-10 PROCEDURE — 99214 OFFICE O/P EST MOD 30 MIN: CPT | Performed by: FAMILY MEDICINE

## 2024-12-10 NOTE — H&P
[facility] ([place], [place]), [address]  [facility] [address]  Dept: [phone]       Patient:  Bernadette Duff  :      1975  MRN:      [id number]    Chief Complaint:    Chief Complaint   Patient presents with    Weight Check     Had kidney stone, flu and pneumonia, when immune system was down was getting painful boils. Looking for referral to pulmonologist and advice for the boils.    Wegovy causing kidney stones?       SUBJECTIVE     History of Present Illness:  Bernadette is being seen today for a follow-up for weight.  Recently had kidney stone on the right side. 24.  Went to [facility].  Then had flu A. ? 24 Pneumonia? And sent to ER.  Did CT chest and has nodules.  NO penumonia  Has not eaten in 9 days but lost 3 lbs.  Stopped wegovy.  Not sure if caused kidney stones.  She felt she eats well and did it like intermittent fasting.  Notes bloating and change in BM's since being on Wegovy.  She is going to [place] for [employer] and knows she will not watch her diet.  She states she could be more active as well.  Notes boils on her mons after being sick and squeezed them and now healing.  Wonders if that is due to everything?      Past Medical History:   Past Medical History:    Depression          OBJECTIVE     Vitals: /66 (BP Location: Left arm, Patient Position: Sitting, Cuff Size: adult)   Pulse 96   Resp 16   Ht 5' 3.5\" (1.613 m)   Wt 191 lb 2 oz (86.7 kg)   LMP 2024 (Exact Date)   SpO2 98%   BMI 33.33 kg/m²     Initial weight loss: 3   Total weight loss: -5   Start weight: 186    Wt Readings from Last 3 Encounters:   12/10/24 191 lb 2 oz (86.7 kg)   11/15/24 194 lb 2 oz (88.1 kg)   24 187 lb 2 oz (84.9 kg)       Patient Medications:    Current Outpatient Medications   Medication Sig Dispense Refill    HYDROcodone-acetaminophen 5-325 MG Oral Tab Take 1 tablet by mouth.      tamsulosin 0.4  MG Oral Cap Take 1 capsule (0.4 mg total) by mouth.      buPROPion  MG Oral Tablet 24 Hr Take 1 tablet (150 mg total) by mouth daily. 30 tablet 1    ALPRAZolam (XANAX) 0.25 MG Oral Tab Take 1 tablet (0.25 mg total) by mouth daily as needed. 30 tablet 1    doxycycline 100 MG Oral Cap TAKE ONE CAPSULE BY MOUTH TWICE DAILY WITH FOOD AND WATER. NEVER ON EMPTY STOMACH. BE CAREFUL WITH SUN      Clindamycin Phosphate 1 % External Gel Apply 1 Application topically 2 (two) times daily as needed.      cetirizine 10 MG Oral Tab Take 1 tablet (10 mg total) by mouth daily.      ALPRAZolam (XANAX) 0.25 MG Oral Tab Take 1 tablet (0.25 mg total) by mouth daily as needed. 10 tablet 1    fluticasone propionate (FLOVENT HFA) 110 MCG/ACT Inhalation Aerosol Inhale 1 puff into the lungs 2 (two) times daily. 12 g 11    albuterol 108 (90 Base) MCG/ACT Inhalation Aero Soln Inhale 1 puff into the lungs every 4 (four) hours as needed for Wheezing. 18 g 11    Azelastine HCl 137 MCG/SPRAY Nasal Solution 1 spray by Nasal route 2 (two) times daily. 30 mL 11    ibuprofen 600 MG Oral Tab Take 1 tablet (600 mg total) by mouth every 6 (six) hours as needed for Pain.      Fluticasone Propionate 50 MCG/ACT Nasal Suspension Use 2 sprays each nostril once daily after shower. Stop if you develop nose bleeds. 1 Inhaler 0     Allergies:  Levaquin [levofloxacin], Dander, Eggs or egg-derived products, Grass, Shrimp, and Levothyroxine     Social History:    Social History     Socioeconomic History    Marital status:      Spouse name: Not on file    Number of children: Not on file    Years of education: Not on file    Highest education level: Not on file   Occupational History    Not on file   Tobacco Use    Smoking status: Never    Smokeless tobacco: Never   Substance and Sexual Activity    Alcohol use: Yes     Alcohol/week: 1.0 standard drink of alcohol     Types: 1 Glasses of wine per week     Comment: 1q week    Drug use: No    Sexual  activity: Not on file   Other Topics Concern     Service Not Asked    Blood Transfusions Not Asked    Caffeine Concern No     Comment: off on    Occupational Exposure Not Asked    Hobby Hazards Not Asked    Sleep Concern Not Asked    Stress Concern Not Asked    Weight Concern Not Asked    Special Diet Not Asked    Back Care Not Asked    Exercise No     Comment: not lately    Bike Helmet Not Asked    Seat Belt Not Asked    Self-Exams Not Asked   Social History Narrative    Not on file     Social Drivers of Health     Financial Resource Strain: Not on file   Food Insecurity: Not on file   Transportation Needs: Not on file   Physical Activity: Not on file   Stress: Not on file   Social Connections: Not on file   Housing Stability: Not on file     Surgical History:    Past Surgical History:   Procedure Laterality Date          D & c  2007    Sinus surgery         Family History:    Family History   Problem Relation Age of Onset    Psychiatric Son         bipolar    Breast Cancer Maternal Grandmother 40    Cancer Maternal Grandmother         breast at age 40's    Breast Cancer Maternal Aunt 60    Cancer Maternal Aunt         breast in her 60's    Psychiatric Mother         bipolar    Cancer Maternal Grandfather     Cancer Paternal Grandmother     Cancer Paternal Grandfather     Heart Disease Neg     Stroke Neg         Food Journal  Reviewed and Discussed:          Patient has a Food Journal?:  no   Patient is reading nutrition labels?  yes, most of the time.   Average Caloric Intake:          unsure  Average CHO Intake:  unsure  Is patient exercising?  no     Eating Habits  Patient states the following:  Eats 2 meal(s) per day  Length of time it takes to consume a meal:  10 mins  # of snacks per day: 0         Type of snacks: n/a  Amount of soda consumption per day:  0  Amount of water (in ounces) per day:  32 oz  Drinking between meals only:  no  Toughest challenge: Trying to get over  everything she had going on flu, pneumonia and kidney stones           ROS:    Pertinent items are noted in HPI.  A comprehensive review of systems was negative.  All other systems were reviewed and are negative    Physical Exam:   /66 (BP Location: Left arm, Patient Position: Sitting, Cuff Size: adult)   Pulse 96   Resp 16   Ht 5' 3.5\" (1.613 m)   Wt 191 lb 2 oz (86.7 kg)   LMP 11/18/2024 (Exact Date)   SpO2 98%   BMI 33.33 kg/m²   General appearance: alert, appears stated age, and cooperative  Head: Normocephalic, without obvious abnormality, atraumatic  Neck: post. Cervical bilaterally adenopathy, no carotid bruit, no JVD, supple, symmetrical, trachea midline, and thyroid not enlarged, symmetric, no tenderness/mass/nodules  Lungs: clear to auscultation bilaterally  Heart: S1, S2 normal, no murmur, click, rub or gallop, regular rate and rhythm  Abdomen: soft, non-tender; bowel sounds normal; no masses,  no organomegaly  Extremities: extremities normal, atraumatic, no cyanosis or edema  Skin: Skin color, texture, turgor normal. No rashes; healing boils on her mons pubis x 2    ASSESSMENT/PLAN     Encounter Diagnoses   Name Primary?    Class 1 obesity due to excess calories without serious comorbidity with body mass index (BMI) of 33.0 to 33.9 in adult Yes    Pulmonary nodule     Exercise counseling     Dietary counseling     History of kidney stones     History of influenza     Boils          No orders of the defined types were placed in this encounter.      Nutritional Goals  Other:  food journal ; deferred dietician    Behavior Modifications Reviewed and Discussed  Drink 64 oz of water per day, Maintain a daily food journal, Identify triggers for eating and manage cues, and Eat slowly and take 20 to 30 minutes to complete each meal    Exercise Goals Reviewed and Discussed    Walk for 30 minutes daily      Meds & Refills for this Visit:  Requested Prescriptions      No prescriptions requested or  ordered in this encounter       Imaging & Consults:  CT CHEST (CPT=71250)    Patient was seen at [facility] and had a CT of her chest which showed a pulmonary nodule.  The question is the nodule infectious or of concern.  She was advised to repeat her CT chest in 6 months.  Advised to drink plenty of fluids and get rest due to the fact she is getting over influenza A.  Patient recently passed a right kidney stone but there seems to be no more stones in the kidneys.  Advised to drink plenty of fluids.  Patient will hold on starting Wegovy and will focus on intermittent fasting and exercise and portion control.  She states she will start at the beginning of the new year since she is going to [place] soon.  The boils have currently resolved.  Will monitor at this time.      Return in about 2 months (around 2/10/2025) for OM -15.

## 2024-12-10 NOTE — PROGRESS NOTES
Food Journal  Reviewed and Discussed:          Patient has a Food Journal?:  no   Patient is reading nutrition labels?  yes, most of the time.   Average Caloric Intake:          unsure  Average CHO Intake:  unsure  Is patient exercising?  no     Eating Habits  Patient states the following:  Eats 2 meal(s) per day  Length of time it takes to consume a meal:  10 mins  # of snacks per day: 0         Type of snacks: n/a  Amount of soda consumption per day:  0  Amount of water (in ounces) per day:  32 oz  Drinking between meals only:  no  Toughest challenge: Trying to get over everything she had going on flu, pneumonia and kidney stones

## 2025-03-14 ENCOUNTER — OFFICE VISIT (OUTPATIENT)
Dept: FAMILY MEDICINE CLINIC | Facility: CLINIC | Age: 50
End: 2025-03-14
Payer: COMMERCIAL

## 2025-03-14 VITALS
WEIGHT: 193.25 LBS | HEIGHT: 63.5 IN | RESPIRATION RATE: 18 BRPM | BODY MASS INDEX: 33.82 KG/M2 | DIASTOLIC BLOOD PRESSURE: 70 MMHG | HEART RATE: 103 BPM | SYSTOLIC BLOOD PRESSURE: 118 MMHG | OXYGEN SATURATION: 97 %

## 2025-03-14 DIAGNOSIS — Z71.82 EXERCISE COUNSELING: ICD-10-CM

## 2025-03-14 DIAGNOSIS — Z00.00 LABORATORY EXAMINATION ORDERED AS PART OF A ROUTINE GENERAL MEDICAL EXAMINATION: ICD-10-CM

## 2025-03-14 DIAGNOSIS — E66.811 CLASS 1 OBESITY DUE TO EXCESS CALORIES WITHOUT SERIOUS COMORBIDITY WITH BODY MASS INDEX (BMI) OF 33.0 TO 33.9 IN ADULT: Primary | ICD-10-CM

## 2025-03-14 DIAGNOSIS — R22.2 MASS ON BACK: ICD-10-CM

## 2025-03-14 DIAGNOSIS — Z13.89 SCREENING FOR GENITOURINARY CONDITION: ICD-10-CM

## 2025-03-14 DIAGNOSIS — Z79.899 MEDICATION MANAGEMENT: ICD-10-CM

## 2025-03-14 DIAGNOSIS — E55.9 VITAMIN D DEFICIENCY DISEASE: ICD-10-CM

## 2025-03-14 DIAGNOSIS — E66.09 CLASS 1 OBESITY DUE TO EXCESS CALORIES WITHOUT SERIOUS COMORBIDITY WITH BODY MASS INDEX (BMI) OF 33.0 TO 33.9 IN ADULT: Primary | ICD-10-CM

## 2025-03-14 DIAGNOSIS — Z71.3 DIETARY COUNSELING: ICD-10-CM

## 2025-03-14 DIAGNOSIS — Z71.85 VACCINE COUNSELING: ICD-10-CM

## 2025-03-14 LAB
ALBUMIN SERPL-MCNC: 4.9 G/DL (ref 3.2–4.8)
ALBUMIN/GLOB SERPL: 1.7 {RATIO} (ref 1–2)
ALP LIVER SERPL-CCNC: 66 U/L
ALT SERPL-CCNC: 20 U/L
ANION GAP SERPL CALC-SCNC: 7 MMOL/L (ref 0–18)
AST SERPL-CCNC: 26 U/L (ref ?–34)
BASOPHILS # BLD AUTO: 0.08 X10(3) UL (ref 0–0.2)
BASOPHILS NFR BLD AUTO: 1.3 %
BILIRUB SERPL-MCNC: 0.6 MG/DL (ref 0.3–1.2)
BILIRUB UR QL STRIP.AUTO: NEGATIVE
BUN BLD-MCNC: 18 MG/DL (ref 9–23)
CALCIUM BLD-MCNC: 9.8 MG/DL (ref 8.7–10.6)
CHLORIDE SERPL-SCNC: 104 MMOL/L (ref 98–112)
CHOLEST SERPL-MCNC: 222 MG/DL (ref ?–200)
CLARITY UR REFRACT.AUTO: CLEAR
CO2 SERPL-SCNC: 29 MMOL/L (ref 21–32)
COLOR UR AUTO: YELLOW
CREAT BLD-MCNC: 1.08 MG/DL
EGFRCR SERPLBLD CKD-EPI 2021: 63 ML/MIN/1.73M2 (ref 60–?)
EOSINOPHIL # BLD AUTO: 0.33 X10(3) UL (ref 0–0.7)
EOSINOPHIL NFR BLD AUTO: 5.5 %
ERYTHROCYTE [DISTWIDTH] IN BLOOD BY AUTOMATED COUNT: 13.1 %
FASTING PATIENT LIPID ANSWER: NO
FASTING STATUS PATIENT QL REPORTED: NO
GLOBULIN PLAS-MCNC: 2.9 G/DL (ref 2–3.5)
GLUCOSE BLD-MCNC: 98 MG/DL (ref 70–99)
GLUCOSE UR STRIP.AUTO-MCNC: NORMAL MG/DL
HCT VFR BLD AUTO: 44.2 %
HDLC SERPL-MCNC: 51 MG/DL (ref 40–59)
HGB BLD-MCNC: 14.9 G/DL
IMM GRANULOCYTES # BLD AUTO: 0.02 X10(3) UL (ref 0–1)
IMM GRANULOCYTES NFR BLD: 0.3 %
KETONES UR STRIP.AUTO-MCNC: NEGATIVE MG/DL
LDLC SERPL CALC-MCNC: 139 MG/DL (ref ?–100)
LEUKOCYTE ESTERASE UR QL STRIP.AUTO: NEGATIVE
LYMPHOCYTES # BLD AUTO: 1.74 X10(3) UL (ref 1–4)
LYMPHOCYTES NFR BLD AUTO: 28.8 %
MCH RBC QN AUTO: 27.9 PG (ref 26–34)
MCHC RBC AUTO-ENTMCNC: 33.7 G/DL (ref 31–37)
MCV RBC AUTO: 82.8 FL
MONOCYTES # BLD AUTO: 0.6 X10(3) UL (ref 0.1–1)
MONOCYTES NFR BLD AUTO: 9.9 %
NEUTROPHILS # BLD AUTO: 3.28 X10 (3) UL (ref 1.5–7.7)
NEUTROPHILS # BLD AUTO: 3.28 X10(3) UL (ref 1.5–7.7)
NEUTROPHILS NFR BLD AUTO: 54.2 %
NITRITE UR QL STRIP.AUTO: NEGATIVE
NONHDLC SERPL-MCNC: 171 MG/DL (ref ?–130)
OSMOLALITY SERPL CALC.SUM OF ELEC: 292 MOSM/KG (ref 275–295)
PH UR STRIP.AUTO: 6.5 [PH] (ref 5–8)
PLATELET # BLD AUTO: 308 10(3)UL (ref 150–450)
POTASSIUM SERPL-SCNC: 4.2 MMOL/L (ref 3.5–5.1)
PROT SERPL-MCNC: 7.8 G/DL (ref 5.7–8.2)
PROT UR STRIP.AUTO-MCNC: NEGATIVE MG/DL
RBC # BLD AUTO: 5.34 X10(6)UL
RBC UR QL AUTO: NEGATIVE
SODIUM SERPL-SCNC: 140 MMOL/L (ref 136–145)
SP GR UR STRIP.AUTO: 1.03 (ref 1–1.03)
T4 FREE SERPL-MCNC: 1.2 NG/DL (ref 0.8–1.7)
TRIGL SERPL-MCNC: 180 MG/DL (ref 30–149)
TSI SER-ACNC: 2.83 UIU/ML (ref 0.55–4.78)
UROBILINOGEN UR STRIP.AUTO-MCNC: NORMAL MG/DL
VIT D+METAB SERPL-MCNC: 33.6 NG/ML (ref 30–100)
VLDLC SERPL CALC-MCNC: 33 MG/DL (ref 0–30)
WBC # BLD AUTO: 6.1 X10(3) UL (ref 4–11)

## 2025-03-14 PROCEDURE — 80061 LIPID PANEL: CPT | Performed by: FAMILY MEDICINE

## 2025-03-14 PROCEDURE — 80050 GENERAL HEALTH PANEL: CPT | Performed by: FAMILY MEDICINE

## 2025-03-14 PROCEDURE — 99214 OFFICE O/P EST MOD 30 MIN: CPT | Performed by: FAMILY MEDICINE

## 2025-03-14 PROCEDURE — 82306 VITAMIN D 25 HYDROXY: CPT | Performed by: FAMILY MEDICINE

## 2025-03-14 PROCEDURE — 84439 ASSAY OF FREE THYROXINE: CPT | Performed by: FAMILY MEDICINE

## 2025-03-14 PROCEDURE — 81003 URINALYSIS AUTO W/O SCOPE: CPT | Performed by: FAMILY MEDICINE

## 2025-03-14 RX ORDER — PHENTERMINE HYDROCHLORIDE 37.5 MG/1
37.5 TABLET ORAL
Qty: 30 TABLET | Refills: 1 | Status: SHIPPED | OUTPATIENT
Start: 2025-03-14

## 2025-03-14 NOTE — PROGRESS NOTES
Food Journal  Reviewed and Discussed:          Patient has a Food Journal?:  no   Patient is reading nutrition labels?  yes, most of the time.   Average Caloric Intake:          unsure  Average CHO Intake:  unsure  Is patient exercising?  yes  Walking with her dog (2.5 miles)     Eating Habits  Patient states the following:  Eats 3 meal(s) per day  Length of time it takes to consume a meal:  10 mins  # of snacks per day: 0         Type of snacks: n/a  Amount of soda consumption per day:  0  Amount of water (in ounces) per day:  32 oz  Drinking between meals only:  no  Toughest challenge: Meal prepping and not enough time in the day

## 2025-03-14 NOTE — PROGRESS NOTES
[place], [address]  [facility] [address]  Dept: [phone]          The following individual(s) verbally consented to be recorded using ambient AI listening technology and understand that they can each withdraw their consent to this listening technology at any point by asking the clinician to turn off or pause the recording:    Patient name: Bernadette Duff  Additional names:  KELSEY Magdaleno student     Patient:  Bernadette Duff  :      1975  MRN:      [id number]    Chief Complaint:    Chief Complaint   Patient presents with    Weight Check       SUBJECTIVE     History of Present Illness:  Bernadette is being seen today for a follow-up for weight.    History of Present Illness  Bernadette Duff is a 49 year old female who presents with challenges in weight management and medication side effects.    She has been experiencing challenges in managing her weight over the past year. Despite initial weight loss, she has gained seven pounds in the last three months. 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 Even when more focused on weight management, weekend meals would hinder her progress.    Her physical activity is limited, consisting of walking her dog three times a day, covering a 0.7-mile loop, but she remains sedentary for the rest of the day. During colder weather, her activity decreased further, reducing her steps to about 5,000 per day, which is below the recommended 7,000 steps for health benefits.    She has tried Wegovy for weight loss but experienced significant side effects, including sleep disturbances, digestive pain, bloating, and no weight loss despite a reduced appetite. She also recalls three hospitalizations for kidney issues and subsequent flu and boils, which she feels  compromised her immune system. She previously tried fluoxetine, which resulted in severe diarrhea, making it impractical for her daily life.    She is concerned about her current weight, noting that it contributes to back pain and makes physical activities more laborious. She is seeking alternative weight management options that do not exacerbate her digestive issues.        Past Medical History:   Past Medical History:    Depression          OBJECTIVE     Vitals: /70 (BP Location: Left arm, Patient Position: Sitting, Cuff Size: large)   Pulse 103   Resp 18   Ht 5' 3.5\" (1.613 m)   Wt 193 lb 4 oz (87.7 kg)   LMP 03/05/2025 (Exact Date)   SpO2 97%   BMI 33.70 kg/m²     Initial weight loss: -2   Total weight loss: -7   Start weight: 186    Wt Readings from Last 3 Encounters:   03/14/25 193 lb 4 oz (87.7 kg)   12/10/24 191 lb 2 oz (86.7 kg)   11/15/24 194 lb 2 oz (88.1 kg)       Patient Medications:    Current Outpatient Medications   Medication Sig Dispense Refill    Phentermine HCl 37.5 MG Oral Tab Take 1 tablet (37.5 mg total) by mouth every morning before breakfast. 30 tablet 1    cetirizine 10 MG Oral Tab Take 1 tablet (10 mg total) by mouth daily.      fluticasone propionate (FLOVENT HFA) 110 MCG/ACT Inhalation Aerosol Inhale 1 puff into the lungs 2 (two) times daily. 12 g 11    albuterol 108 (90 Base) MCG/ACT Inhalation Aero Soln Inhale 1 puff into the lungs every 4 (four) hours as needed for Wheezing. 18 g 11    Azelastine HCl 137 MCG/SPRAY Nasal Solution 1 spray by Nasal route 2 (two) times daily. 30 mL 11    ibuprofen 600 MG Oral Tab Take 1 tablet (600 mg total) by mouth every 6 (six) hours as needed for Pain.      Fluticasone Propionate 50 MCG/ACT Nasal Suspension Use 2 sprays each nostril once daily after shower. Stop if you develop nose bleeds. 1 Inhaler 0    tamsulosin 0.4 MG Oral Cap Take 1 capsule (0.4 mg total) by mouth. (Patient not taking: Reported on 3/14/2025)      buPROPion  MG  Oral Tablet 24 Hr Take 1 tablet (150 mg total) by mouth daily. (Patient not taking: Reported on 3/14/2025) 30 tablet 1    Clindamycin Phosphate 1 % External Gel Apply 1 Application topically 2 (two) times daily as needed. (Patient not taking: Reported on 3/14/2025)      ALPRAZolam (XANAX) 0.25 MG Oral Tab Take 1 tablet (0.25 mg total) by mouth daily as needed. (Patient not taking: Reported on 3/14/2025) 10 tablet 1     Allergies:  Levaquin [levofloxacin], Dander, Eggs or egg-derived products, Grass, Shrimp, and Levothyroxine     Social History:    Social History     Socioeconomic History    Marital status:      Spouse name: Not on file    Number of children: Not on file    Years of education: Not on file    Highest education level: Not on file   Occupational History    Not on file   Tobacco Use    Smoking status: Never    Smokeless tobacco: Never   Substance and Sexual Activity    Alcohol use: Yes     Alcohol/week: 1.0 standard drink of alcohol     Types: 1 Glasses of wine per week     Comment: 1q week    Drug use: No    Sexual activity: Not on file   Other Topics Concern     Service Not Asked    Blood Transfusions Not Asked    Caffeine Concern No     Comment: off on    Occupational Exposure Not Asked    Hobby Hazards Not Asked    Sleep Concern Not Asked    Stress Concern Not Asked    Weight Concern Not Asked    Special Diet Not Asked    Back Care Not Asked    Exercise No     Comment: not lately    Bike Helmet Not Asked    Seat Belt Not Asked    Self-Exams Not Asked   Social History Narrative    Not on file     Social Drivers of Health     Food Insecurity: No Food Insecurity (3/14/2025)    NCSS - Food Insecurity     Worried About Running Out of Food in the Last Year: No     Ran Out of Food in the Last Year: No   Transportation Needs: No Transportation Needs (3/14/2025)    NCSS - Transportation     Lack of Transportation: No   Stress: Not on file   Housing Stability: Not At Risk (3/14/2025)    NCSS -  Housing/Utilities     Has Housing: Yes     Worried About Losing Housing: No     Unable to Get Utilities: No     Surgical History:    Past Surgical History:   Procedure Laterality Date          D & c  2007    Sinus surgery         Family History:    Family History   Problem Relation Age of Onset    Psychiatric Son         bipolar    Breast Cancer Maternal Grandmother 40    Cancer Maternal Grandmother         breast at age 40's    Breast Cancer Maternal Aunt 60    Cancer Maternal Aunt         breast in her 60's    Psychiatric Mother         bipolar    Cancer Maternal Grandfather     Cancer Paternal Grandmother     Cancer Paternal Grandfather     Heart Disease Neg     Stroke Neg         Food Journal  Reviewed and Discussed:          Patient has a Food Journal?:  no   Patient is reading nutrition labels?  yes, most of the time.   Average Caloric Intake:          unsure  Average CHO Intake:  unsure  Is patient exercising?  yes  Walking with her dog (2.5 miles)     Eating Habits  Patient states the following:  Eats 3 meal(s) per day  Length of time it takes to consume a meal:  10 mins  # of snacks per day: 0         Type of snacks: n/a  Amount of soda consumption per day:  0  Amount of water (in ounces) per day:  32 oz  Drinking between meals only:  no  Toughest challenge: Meal prepping and not enough time in the day              ROS:    Pertinent items are noted in HPI.  A comprehensive review of systems was negative.  All other systems were reviewed and are negative    Physical Exam:   /70 (BP Location: Left arm, Patient Position: Sitting, Cuff Size: large)   Pulse 103   Resp 18   Ht 5' 3.5\" (1.613 m)   Wt 193 lb 4 oz (87.7 kg)   LMP 2025 (Exact Date)   SpO2 97%   BMI 33.70 kg/m²   General appearance: alert, appears stated age, and cooperative  Head: Normocephalic, without obvious abnormality, atraumatic  Neck: post. Cervical bilaterally adenopathy, no carotid bruit, no JVD, supple,  symmetrical, trachea midline, and thyroid not enlarged, symmetric, no tenderness/mass/nodules  Lungs: clear to auscultation bilaterally  Heart: S1, S2 normal, no murmur, click, rub or gallop, regular rate and rhythm  Abdomen: soft, non-tender; bowel sounds normal; no masses,  no organomegaly  Extremities: extremities normal, atraumatic, no cyanosis or edema  Skin: Skin color, texture, turgor normal. No rashes or lesions    ASSESSMENT/PLAN     Encounter Diagnoses   Name Primary?    Class 1 obesity due to excess calories without serious comorbidity with body mass index (BMI) of 33.0 to 33.9 in adult Yes    Vitamin D deficiency disease     Exercise counseling     Dietary counseling     Medication management     Screening for genitourinary condition     Vaccine counseling     Laboratory examination ordered as part of a routine general medical examination     Mass on back          Orders Placed This Encounter   Procedures    CBC With Differential With Platelet    Comp Metabolic Panel (14)    Lipid Panel    Vitamin D, 25-Hydroxy    Free T4, (Free Thyroxine)    Assay, Thyroid Stim Hormone    Urinalysis, Routine    Prevnar 20 (PCV20) [07719]       Nutritional Goals  Other:  food journal ; deferred dietician    Behavior Modifications Reviewed and Discussed  Drink 64 oz of water per day, Maintain a daily food journal, Identify triggers for eating and manage cues, and Eat slowly and take 20 to 30 minutes to complete each meal    Exercise Goals Reviewed and Discussed    Walk for 30 minutes daily      Meds & Refills for this Visit:  Requested Prescriptions     Signed Prescriptions Disp Refills    Phentermine HCl 37.5 MG Oral Tab 30 tablet 1     Sig: Take 1 tablet (37.5 mg total) by mouth every morning before breakfast.       Imaging & Consults:  PCV20 VACCINE FOR INTRAMUSCULAR USE  US BACK UPPER/CHEST WALL(CPT=76604)    Assessment & Plan  Obesity  Class 1 obesity with BMI 33.0-33.9. Previous medications ineffective due to side  effects. Interested in phentermine for appetite suppression. EKG required for cardiac safety. Potential side effects include increased heart rate, dry mouth, jitteriness. Advised to avoid caffeine and start with half dose to assess tolerance. Food journaling recommended.  - Prescribe phentermine - EKG done 4/2024  - Advise avoiding caffeine and start with half dose.  - Encourage food journaling.  - Discuss reducing fast food consumption.    Kidney stones  Recurrent kidney stones with three hospitalizations in the past year.    General Health Maintenance  Due for routine health maintenance, vaccinations, and blood work. Overdue for physical examination and yearly labs. Prevnar 20 recommended for pneumonia prevention.  - Order blood work for immune system, liver, kidney, cholesterol, vitamin D, thyroid, and urine.  - Administer Prevnar 20 vaccine at next visit.  - Schedule physical examination.    Follow-up  Scheduled for follow-up in one month to assess weight management and medication tolerance.  - Follow up in one month to evaluate weight loss progress and phentermine tolerance.        Return in about 4 weeks (around 4/11/2025) for OM -15.

## 2025-03-15 NOTE — PROGRESS NOTES
Dear Bernadette,    Your cholesterol is elevated -- focus on diet and exercise.  The rest of your blood work and urine are stable.    Sincerely,  Dr. Clifton

## 2025-04-11 ENCOUNTER — OFFICE VISIT (OUTPATIENT)
Dept: FAMILY MEDICINE CLINIC | Facility: CLINIC | Age: 50
End: 2025-04-11
Payer: COMMERCIAL

## 2025-04-11 VITALS
OXYGEN SATURATION: 97 % | DIASTOLIC BLOOD PRESSURE: 66 MMHG | SYSTOLIC BLOOD PRESSURE: 118 MMHG | HEART RATE: 83 BPM | RESPIRATION RATE: 16 BRPM | HEIGHT: 63.5 IN | BODY MASS INDEX: 33.07 KG/M2 | WEIGHT: 189 LBS

## 2025-04-11 DIAGNOSIS — E66.09 CLASS 1 OBESITY DUE TO EXCESS CALORIES WITHOUT SERIOUS COMORBIDITY WITH BODY MASS INDEX (BMI) OF 32.0 TO 32.9 IN ADULT: Primary | ICD-10-CM

## 2025-04-11 DIAGNOSIS — Z71.82 EXERCISE COUNSELING: ICD-10-CM

## 2025-04-11 DIAGNOSIS — E66.811 CLASS 1 OBESITY DUE TO EXCESS CALORIES WITHOUT SERIOUS COMORBIDITY WITH BODY MASS INDEX (BMI) OF 32.0 TO 32.9 IN ADULT: Primary | ICD-10-CM

## 2025-04-11 DIAGNOSIS — Z79.899 MEDICATION MANAGEMENT: ICD-10-CM

## 2025-04-11 DIAGNOSIS — Z71.3 DIETARY COUNSELING: ICD-10-CM

## 2025-04-11 PROCEDURE — 99214 OFFICE O/P EST MOD 30 MIN: CPT | Performed by: FAMILY MEDICINE

## 2025-04-11 PROCEDURE — 3008F BODY MASS INDEX DOCD: CPT | Performed by: FAMILY MEDICINE

## 2025-04-11 PROCEDURE — 3074F SYST BP LT 130 MM HG: CPT | Performed by: FAMILY MEDICINE

## 2025-04-11 PROCEDURE — 3078F DIAST BP <80 MM HG: CPT | Performed by: FAMILY MEDICINE

## 2025-04-11 NOTE — PROGRESS NOTES
Food Journal  Reviewed and Discussed:          Patient has a Food Journal?:  yes and no just not super consistent   Patient is reading nutrition labels?  yes, most of the time.   Average Caloric Intake:          unsure  Average CHO Intake:  unsure  Is patient exercising?  yes  Walking with her dog (2.5 miles)     Eating Habits  Patient states the following:  Eats 2 meal(s) per day  Length of time it takes to consume a meal:  10 mins  # of snacks per day: 1        Type of snacks: Optivia bars   Amount of soda consumption per day:  0  Amount of water (in ounces) per day:  64 oz  Drinking between meals only:  no  Toughest challenge: Exercise as she is tired more frequently

## 2025-04-13 NOTE — PROGRESS NOTES
[facility], [address]  [facility] [address]  Dept: [phone]      Patient:  Bernadette Duff  :      1975  MRN:      [id number]    Chief Complaint:    Chief Complaint   Patient presents with    Weight Check     Has been splitting the medication in half as she was having symptoms of racing heart, flushed cheeks and increased anxiety. Since splitting symptoms have subsided     Follow - Up     Review labs        SUBJECTIVE     History of Present Illness:  Bernadette is being seen today for a follow-up for weight.    Patient states she was having side effects from the medication so she decided to cut the phentermine in half because she was feeling palpitations and shaky from the medication and she noted flushed cheeks and increased anxiety.  She states that the symptoms have resolved with cutting the tablets in half and feels that the medication is helping her.  She also like to review recent blood work.    Past Medical History:   Past Medical History:    Depression          OBJECTIVE     Vitals: /66 (BP Location: Left arm, Patient Position: Sitting, Cuff Size: adult)   Pulse 83   Resp 16   Ht 5' 3.5\" (1.613 m)   Wt 189 lb (85.7 kg)   LMP 2025 (Exact Date)   SpO2 97%   BMI 32.95 kg/m²     Initial weight loss: 4   Total weight loss: -3   Start weight: 186    Wt Readings from Last 3 Encounters:   25 189 lb (85.7 kg)   25 193 lb 4 oz (87.7 kg)   12/10/24 191 lb 2 oz (86.7 kg)       Patient Medications:    Current Outpatient Medications   Medication Sig Dispense Refill    Phentermine HCl 37.5 MG Oral Tab Take 1 tablet (37.5 mg total) by mouth every morning before breakfast. 30 tablet 1    cetirizine 10 MG Oral Tab Take 1 tablet (10 mg total) by mouth daily.      fluticasone propionate (FLOVENT HFA) 110 MCG/ACT Inhalation Aerosol Inhale 1 puff into the lungs 2 (two) times daily. 12 g 11    albuterol 108 (90  Base) MCG/ACT Inhalation Aero Soln Inhale 1 puff into the lungs every 4 (four) hours as needed for Wheezing. 18 g 11    Azelastine HCl 137 MCG/SPRAY Nasal Solution 1 spray by Nasal route 2 (two) times daily. 30 mL 11    ibuprofen 600 MG Oral Tab Take 1 tablet (600 mg total) by mouth every 6 (six) hours as needed for Pain.      Fluticasone Propionate 50 MCG/ACT Nasal Suspension Use 2 sprays each nostril once daily after shower. Stop if you develop nose bleeds. 1 Inhaler 0    Clindamycin Phosphate 1 % External Gel Apply 1 Application topically 2 (two) times daily as needed. (Patient not taking: Reported on 4/11/2025)       Allergies:  Levaquin [levofloxacin], Dander, Eggs or egg-derived products, Grass, Shrimp, and Levothyroxine     Social History:    Social History     Socioeconomic History    Marital status:      Spouse name: Not on file    Number of children: Not on file    Years of education: Not on file    Highest education level: Not on file   Occupational History    Not on file   Tobacco Use    Smoking status: Never    Smokeless tobacco: Never   Substance and Sexual Activity    Alcohol use: Yes     Alcohol/week: 1.0 standard drink of alcohol     Types: 1 Glasses of wine per week     Comment: 1q week    Drug use: No    Sexual activity: Not on file   Other Topics Concern     Service Not Asked    Blood Transfusions Not Asked    Caffeine Concern No     Comment: off on    Occupational Exposure Not Asked    Hobby Hazards Not Asked    Sleep Concern Not Asked    Stress Concern Not Asked    Weight Concern Not Asked    Special Diet Not Asked    Back Care Not Asked    Exercise No     Comment: not lately    Bike Helmet Not Asked    Seat Belt Not Asked    Self-Exams Not Asked   Social History Narrative    Not on file     Social Drivers of Health     Food Insecurity: No Food Insecurity (3/14/2025)    NCSS - Food Insecurity     Worried About Running Out of Food in the Last Year: No     Ran Out of Food in the  Last Year: No   Transportation Needs: No Transportation Needs (3/14/2025)    NCSS - Transportation     Lack of Transportation: No   Stress: Not on file   Housing Stability: Not At Risk (3/14/2025)    NCSS - Housing/Utilities     Has Housing: Yes     Worried About Losing Housing: No     Unable to Get Utilities: No     Surgical History:    Past Surgical History:   Procedure Laterality Date          D & c  2007    Sinus surgery         Family History:    Family History   Problem Relation Age of Onset    Psychiatric Son         bipolar    Breast Cancer Maternal Grandmother 40    Cancer Maternal Grandmother         breast at age 40's    Breast Cancer Maternal Aunt 60    Cancer Maternal Aunt         breast in her 60's    Psychiatric Mother         bipolar    Cancer Maternal Grandfather     Cancer Paternal Grandmother     Cancer Paternal Grandfather     Heart Disease Neg     Stroke Neg         Food Journal  Reviewed and Discussed:          Patient has a Food Journal?:  yes and no just not super consistent   Patient is reading nutrition labels?  yes, most of the time.   Average Caloric Intake:          unsure  Average CHO Intake:  unsure  Is patient exercising?  yes  Walking with her dog (2.5 miles)     Eating Habits  Patient states the following:  Eats 2 meal(s) per day  Length of time it takes to consume a meal:  10 mins  # of snacks per day: 1        Type of snacks: Optivia bars   Amount of soda consumption per day:  0  Amount of water (in ounces) per day:  64 oz  Drinking between meals only:  no  Toughest challenge: Exercise as she is tired more frequently           ROS:    Pertinent items are noted in HPI.  A comprehensive review of systems was negative.  All other systems were reviewed and are negative    Physical Exam:   /66 (BP Location: Left arm, Patient Position: Sitting, Cuff Size: adult)   Pulse 83   Resp 16   Ht 5' 3.5\" (1.613 m)   Wt 189 lb (85.7 kg)   LMP 2025 (Exact Date)    SpO2 97%   BMI 32.95 kg/m²   General appearance: alert, appears stated age, and cooperative  Head: Normocephalic, without obvious abnormality, atraumatic  Neck: post. Cervical bilaterally adenopathy, no carotid bruit, no JVD, supple, symmetrical, trachea midline, and thyroid not enlarged, symmetric, no tenderness/mass/nodules  Lungs: clear to auscultation bilaterally  Heart: S1, S2 normal, no murmur, click, rub or gallop, regular rate and rhythm  Abdomen: soft, non-tender; bowel sounds normal; no masses,  no organomegaly  Extremities: extremities normal, atraumatic, no cyanosis or edema  Skin: Skin color, texture, turgor normal. No rashes or lesions    ASSESSMENT/PLAN     Encounter Diagnoses   Name Primary?    Class 1 obesity due to excess calories without serious comorbidity with body mass index (BMI) of 32.0 to 32.9 in adult Yes    Exercise counseling     Dietary counseling     Medication management          No orders of the defined types were placed in this encounter.      Nutritional Goals  Other:  food journal ; deferred dietician    Behavior Modifications Reviewed and Discussed  Drink 64 oz of water per day, Maintain a daily food journal, Identify triggers for eating and manage cues, and Eat slowly and take 20 to 30 minutes to complete each meal    Exercise Goals Reviewed and Discussed    Walk for 30 minutes daily      Meds & Refills for this Visit:  Requested Prescriptions      No prescriptions requested or ordered in this encounter       Imaging & Consults:  None  Encounter Diagnoses   Name Primary?    Class 1 obesity due to excess calories without serious comorbidity with body mass index (BMI) of 32.0 to 32.9 in adult Yes    Exercise counseling     Dietary counseling     Medication management        No orders of the defined types were placed in this encounter.      Meds & Refills for this Visit:  Requested Prescriptions      No prescriptions requested or ordered in this encounter       Imaging &  Consults:  None    Cutting phentermine in 1/2.  Doing well.  Push fluids to avoid further kidney stones.  Advised to lose 2-4 lbs a month.      Return in about 4 weeks (around 5/9/2025) for OM -15.

## 2025-06-20 ENCOUNTER — HOSPITAL ENCOUNTER (OUTPATIENT)
Dept: CT IMAGING | Age: 50
Discharge: HOME | End: 2025-06-20
Attending: FAMILY MEDICINE
Payer: COMMERCIAL

## 2025-06-20 ENCOUNTER — HOSPITAL ENCOUNTER (OUTPATIENT)
Dept: MAMMOGRAPHY | Age: 50
Discharge: HOME | End: 2025-06-20
Attending: FAMILY MEDICINE
Payer: COMMERCIAL

## 2025-06-20 DIAGNOSIS — R91.1 PULMONARY NODULE: ICD-10-CM

## 2025-06-20 DIAGNOSIS — Z12.31 SCREENING MAMMOGRAM FOR BREAST CANCER: ICD-10-CM

## 2025-06-20 PROCEDURE — 77063 BREAST TOMOSYNTHESIS BI: CPT | Performed by: FAMILY MEDICINE

## 2025-06-20 PROCEDURE — 71250 CT THORAX DX C-: CPT | Performed by: FAMILY MEDICINE

## 2025-06-20 PROCEDURE — 77067 SCR MAMMO BI INCL CAD: CPT | Performed by: FAMILY MEDICINE

## 2025-06-23 PROBLEM — L72.0 EPIDERMAL CYST: Status: ACTIVE | Noted: 2017-10-09

## 2025-06-23 PROBLEM — J45.909 ASTHMA (HCC): Status: ACTIVE | Noted: 2024-10-30

## 2025-06-23 PROBLEM — L82.1 SEBORRHEIC KERATOSIS: Status: ACTIVE | Noted: 2024-10-08

## 2025-06-23 PROBLEM — N39.3 STRESS INCONTINENCE: Status: ACTIVE | Noted: 2025-06-23

## 2025-06-23 PROBLEM — F41.9 ANXIETY DISORDER: Status: ACTIVE | Noted: 2024-10-30

## 2025-06-23 PROBLEM — D22.5 MELANOCYTIC NEVUS OF TRUNK: Status: ACTIVE | Noted: 2024-10-08

## 2025-06-23 PROBLEM — N81.11 CYSTOCELE, MIDLINE: Status: ACTIVE | Noted: 2025-06-23

## 2025-06-23 RX ORDER — BUPROPION HYDROCHLORIDE 150 MG/1
TABLET ORAL
COMMUNITY

## 2025-06-24 ENCOUNTER — OFFICE VISIT (OUTPATIENT)
Dept: FAMILY MEDICINE CLINIC | Facility: CLINIC | Age: 50
End: 2025-06-24
Payer: COMMERCIAL

## 2025-06-24 VITALS
SYSTOLIC BLOOD PRESSURE: 118 MMHG | RESPIRATION RATE: 18 BRPM | HEART RATE: 93 BPM | HEIGHT: 63.25 IN | DIASTOLIC BLOOD PRESSURE: 66 MMHG | WEIGHT: 186 LBS | BODY MASS INDEX: 32.55 KG/M2 | OXYGEN SATURATION: 99 %

## 2025-06-24 DIAGNOSIS — Z12.11 SCREENING FOR MALIGNANT NEOPLASM OF COLON: ICD-10-CM

## 2025-06-24 DIAGNOSIS — Z00.00 ROUTINE GENERAL MEDICAL EXAMINATION AT A HEALTH CARE FACILITY: Primary | ICD-10-CM

## 2025-06-24 DIAGNOSIS — Z12.4 SCREENING FOR MALIGNANT NEOPLASM OF CERVIX: ICD-10-CM

## 2025-06-24 DIAGNOSIS — R91.1 PULMONARY NODULE: ICD-10-CM

## 2025-06-24 DIAGNOSIS — Z11.3 SCREENING FOR STD (SEXUALLY TRANSMITTED DISEASE): ICD-10-CM

## 2025-06-24 DIAGNOSIS — Z23 NEED FOR VACCINATION: ICD-10-CM

## 2025-06-24 LAB
HBV SURFACE AG SER-ACNC: <0.1 [IU]/L
HBV SURFACE AG SERPL QL IA: NONREACTIVE
HCV AB SERPL QL IA: NONREACTIVE
T PALLIDUM AB SER QL IA: NONREACTIVE

## 2025-06-24 PROCEDURE — 3074F SYST BP LT 130 MM HG: CPT | Performed by: FAMILY MEDICINE

## 2025-06-24 PROCEDURE — 87591 N.GONORRHOEAE DNA AMP PROB: CPT | Performed by: FAMILY MEDICINE

## 2025-06-24 PROCEDURE — 87491 CHLMYD TRACH DNA AMP PROBE: CPT | Performed by: FAMILY MEDICINE

## 2025-06-24 PROCEDURE — 90746 HEPB VACCINE 3 DOSE ADULT IM: CPT | Performed by: FAMILY MEDICINE

## 2025-06-24 PROCEDURE — 3078F DIAST BP <80 MM HG: CPT | Performed by: FAMILY MEDICINE

## 2025-06-24 PROCEDURE — 81514 NFCT DS BV&VAGINITIS DNA ALG: CPT | Performed by: FAMILY MEDICINE

## 2025-06-24 PROCEDURE — 90677 PCV20 VACCINE IM: CPT | Performed by: FAMILY MEDICINE

## 2025-06-24 PROCEDURE — 87624 HPV HI-RISK TYP POOLED RSLT: CPT | Performed by: FAMILY MEDICINE

## 2025-06-24 PROCEDURE — 90471 IMMUNIZATION ADMIN: CPT | Performed by: FAMILY MEDICINE

## 2025-06-24 PROCEDURE — 99396 PREV VISIT EST AGE 40-64: CPT | Performed by: FAMILY MEDICINE

## 2025-06-24 PROCEDURE — 90472 IMMUNIZATION ADMIN EACH ADD: CPT | Performed by: FAMILY MEDICINE

## 2025-06-24 PROCEDURE — 87389 HIV-1 AG W/HIV-1&-2 AB AG IA: CPT | Performed by: FAMILY MEDICINE

## 2025-06-24 PROCEDURE — 86803 HEPATITIS C AB TEST: CPT | Performed by: FAMILY MEDICINE

## 2025-06-24 PROCEDURE — 86780 TREPONEMA PALLIDUM: CPT | Performed by: FAMILY MEDICINE

## 2025-06-24 PROCEDURE — 3008F BODY MASS INDEX DOCD: CPT | Performed by: FAMILY MEDICINE

## 2025-06-24 PROCEDURE — 87340 HEPATITIS B SURFACE AG IA: CPT | Performed by: FAMILY MEDICINE

## 2025-06-24 NOTE — H&P
CC: Annual Physical Exam    HPI:   Bernadette Duff is a 50 year old female who presents for a complete physical exam. Symptoms: periods are regular. Patient complains of nothing.     Wt Readings from Last 6 Encounters:   06/24/25 186 lb (84.4 kg)   04/11/25 189 lb (85.7 kg)   03/14/25 193 lb 4 oz (87.7 kg)   12/10/24 191 lb 2 oz (86.7 kg)   11/15/24 194 lb 2 oz (88.1 kg)   06/18/24 187 lb 2 oz (84.9 kg)     Body mass index is 32.69 kg/m².     Results for orders placed or performed in visit on 03/14/25   CBC With Differential With Platelet    Collection Time: 03/14/25 10:16 AM   Result Value Ref Range    WBC 6.1 4.0 - 11.0 x10(3) uL    RBC 5.34 (H) 3.80 - 5.30 x10(6)uL    HGB 14.9 12.0 - 16.0 g/dL    HCT 44.2 35.0 - 48.0 %    .0 150.0 - 450.0 10(3)uL    MCV 82.8 80.0 - 100.0 fL    MCH 27.9 26.0 - 34.0 pg    MCHC 33.7 31.0 - 37.0 g/dL    RDW 13.1 %    Neutrophil Absolute Prelim 3.28 1.50 - 7.70 x10 (3) uL    Neutrophil Absolute 3.28 1.50 - 7.70 x10(3) uL    Lymphocyte Absolute 1.74 1.00 - 4.00 x10(3) uL    Monocyte Absolute 0.60 0.10 - 1.00 x10(3) uL    Eosinophil Absolute 0.33 0.00 - 0.70 x10(3) uL    Basophil Absolute 0.08 0.00 - 0.20 x10(3) uL    Immature Granulocyte Absolute 0.02 0.00 - 1.00 x10(3) uL    Neutrophil % 54.2 %    Lymphocyte % 28.8 %    Monocyte % 9.9 %    Eosinophil % 5.5 %    Basophil % 1.3 %    Immature Granulocyte % 0.3 %   Comp Metabolic Panel (14)    Collection Time: 03/14/25 10:16 AM   Result Value Ref Range    Glucose 98 70 - 99 mg/dL    Sodium 140 136 - 145 mmol/L    Potassium 4.2 3.5 - 5.1 mmol/L    Chloride 104 98 - 112 mmol/L    CO2 29.0 21.0 - 32.0 mmol/L    Anion Gap 7 0 - 18 mmol/L    BUN 18 9 - 23 mg/dL    Creatinine 1.08 (H) 0.55 - 1.02 mg/dL    Calcium, Total 9.8 8.7 - 10.6 mg/dL    Calculated Osmolality 292 275 - 295 mOsm/kg    eGFR-Cr 63 >=60 mL/min/1.73m2    AST 26 <34 U/L    ALT 20 10 - 49 U/L    Alkaline Phosphatase 66 39 - 100 U/L    Bilirubin, Total 0.6 0.3 - 1.2 mg/dL     Total Protein 7.8 5.7 - 8.2 g/dL    Albumin 4.9 (H) 3.2 - 4.8 g/dL    Globulin  2.9 2.0 - 3.5 g/dL    A/G Ratio 1.7 1.0 - 2.0    Patient Fasting for CMP? No    Lipid Panel    Collection Time: 03/14/25 10:16 AM   Result Value Ref Range    Cholesterol, Total 222 (H) <200 mg/dL    HDL Cholesterol 51 40 - 59 mg/dL    Triglycerides 180 (H) 30 - 149 mg/dL    LDL Cholesterol 139 (H) <100 mg/dL    VLDL 33 (H) 0 - 30 mg/dL    Non HDL Chol 171 (H) <130 mg/dL    Patient Fasting for Lipid? No    Vitamin D, 25-Hydroxy    Collection Time: 03/14/25 10:16 AM   Result Value Ref Range    Vitamin D, 25OH, Total 33.6 30.0 - 100.0 ng/mL   Free T4, (Free Thyroxine)    Collection Time: 03/14/25 10:16 AM   Result Value Ref Range    Free T4 1.2 0.8 - 1.7 ng/dL   Assay, Thyroid Stim Hormone    Collection Time: 03/14/25 10:16 AM   Result Value Ref Range    TSH 2.828 0.550 - 4.780 uIU/mL   Urinalysis, Routine    Collection Time: 03/14/25 10:16 AM   Result Value Ref Range    Urine Color Yellow Yellow    Clarity Urine Clear Clear    Spec Gravity 1.027 1.005 - 1.030    Glucose Urine Normal Normal mg/dL    Bilirubin Urine Negative Negative    Ketones Urine Negative Negative mg/dL    Blood Urine Negative Negative    pH Urine 6.5 5.0 - 8.0    Protein Urine Negative Negative mg/dL    Urobilinogen Urine Normal Normal mg/dL    Nitrite Urine Negative Negative    Leukocyte Esterase Urine Negative Negative    Microscopic Microscopic not indicated        Current Outpatient Medications   Medication Sig Dispense Refill    buPROPion  MG Oral Tablet 24 Hr Take by mouth.      cetirizine 10 MG Oral Tab Take 1 tablet (10 mg total) by mouth daily.      albuterol 108 (90 Base) MCG/ACT Inhalation Aero Soln Inhale 1 puff into the lungs every 4 (four) hours as needed for Wheezing. 18 g 11    Azelastine HCl 137 MCG/SPRAY Nasal Solution 1 spray by Nasal route 2 (two) times daily. 30 mL 11    ibuprofen 600 MG Oral Tab Take 1 tablet (600 mg total) by mouth every  6 (six) hours as needed for Pain.      Fluticasone Propionate 50 MCG/ACT Nasal Suspension Use 2 sprays each nostril once daily after shower. Stop if you develop nose bleeds. 1 Inhaler 0    Phentermine HCl 37.5 MG Oral Tab Take 1 tablet (37.5 mg total) by mouth every morning before breakfast. (Patient not taking: Reported on 2025) 30 tablet 1      Allergies   Allergen Reactions    Levaquin [Levofloxacin] JITTERY     Can't tolerate this one    Dander UNKNOWN    Eggs Or Egg-Derived Products OTHER (SEE COMMENTS)     Allergy to egg whites noted on serum allergy testing    Grass UNKNOWN    Shrimp OTHER (SEE COMMENTS)     Allergy noted on serum allergy testing    Levothyroxine DIARRHEA      Past Medical History:    Depression      Past Surgical History:   Procedure Laterality Date          D & c  2007    Sinus surgery          Family History   Problem Relation Age of Onset    Psychiatric Mother         bipolar    Psychiatric Son         bipolar    Breast Cancer Maternal Grandmother 40    Cancer Maternal Grandmother         breast at age 40's    Cancer Maternal Grandfather     Cancer Paternal Grandmother     Cancer Paternal Grandfather     Breast Cancer Maternal Aunt 60    Cancer Maternal Aunt         breast in her 60's    Breast Cancer Maternal Aunt 80        80's    Heart Disease Neg     Stroke Neg       Social History:   Social History     Socioeconomic History    Marital status:    Tobacco Use    Smoking status: Never    Smokeless tobacco: Never   Substance and Sexual Activity    Alcohol use: Yes     Alcohol/week: 1.0 standard drink of alcohol     Types: 1 Glasses of wine per week     Comment: 1q week    Drug use: No   Other Topics Concern    Caffeine Concern No     Comment: off on    Exercise No     Comment: not lately     Social Drivers of Health     Food Insecurity: No Food Insecurity (2025)    NCSS - Food Insecurity     Worried About Running Out of Food in the Last Year: No      Ran Out of Food in the Last Year: No   Transportation Needs: No Transportation Needs (6/24/2025)    NCSS - Transportation     Lack of Transportation: No   Housing Stability: Not At Risk (6/24/2025)    NCSS - Housing/Utilities     Has Housing: Yes     Worried About Losing Housing: No     Unable to Get Utilities: No     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   Exercise: 5 times per week.  Diet: watches fats closely, watches sugar closely and watches calories closely     REVIEW OF SYSTEMS:   GENERAL: feels well otherwise  SKIN: denies any unusual skin lesions  EYES:denies blurred vision or double vision  HEENT: denies nasal congestion, sinus pain or ST  LUNGS: denies shortness of breath with exertion  CARDIOVASCULAR: denies chest pain on exertion  GI: denies abdominal pain,denies heartburn  : denies dysuria, vaginal discharge or itching,periods regular   MUSCULOSKELETAL: denies back pain  NEURO: denies headaches  PSYCHE: denies depression or anxiety  HEMATOLOGIC: denies hx of anemia  ENDOCRINE: denies thyroid history  ALL/ASTHMA: denies hx of allergy or asthma    EXAM:   /66 (BP Location: Left arm, Patient Position: Sitting, Cuff Size: adult)   Pulse 93   Resp 18   Ht 5' 3.25\" (1.607 m)   Wt 186 lb (84.4 kg)   LMP 06/20/2025 (Exact Date)   SpO2 99%   BMI 32.69 kg/m²   Body mass index is 32.69 kg/m².   GENERAL: well developed, well nourished,in no apparent distress  SKIN: no rashes,no suspicious lesions  HEENT: atraumatic, normocephalic,ears and throat are clear  EYES: EOMI,conjunctiva are clear  NECK: supple,no adenopathy,no bruits; no thyromegaly  CHEST: no chest tenderness  BREAST: no dominant or suspicious mass, no nipple discharge; fibrocystic  LUNGS: clear to auscultation  CARDIO: RRR without murmur  GI: good BS's,no masses, HSM or tenderness  :introitus is normal,scant discharge,cervix is pink,no adnexal masses or tenderness, PAP was done; on her period -- day #4 -- cystic  labia  MUSCULOSKELETAL: back is not tender,FROM of the back  EXTREMITIES: no cyanosis, clubbing or edema  NEURO: Oriented times three,cranial nerves are intact,motor and sensory are grossly intact    ASSESSMENT AND PLAN:   Bernadette Duff is a 50 year old female who presents for a complete physical exam.  Pap and pelvic done.   Order in for mammogram.    Self breast exam explained.   Health maintenance, will check fasting Lipids, CMP, CBC, TSH c ref. , vitamin D    Last eye exam -- 1/2023  Last dental exam -- 6/2023  Advised to lose 5 lbs in 3 months.  Given hep B # 3 and Prevnar 20.        Pt' s weight is Body mass index is 32.69 kg/m²., recommended low fat diet and aerobic exercise 30 minutes three times weekly.    The patient indicates understanding of these issues and agrees to the plan.  The patient is asked to return: Return in about 4 weeks (around 7/22/2025) for OM -15.

## 2025-06-24 NOTE — PROGRESS NOTES
The following individual(s) verbally declined to be recorded using ambient AI listening technology and understand that they can each withdraw their consent to this listening technology at any point by asking the clinician to turn off or pause the recording:    Patient name: Bernadette Duff

## 2025-06-25 LAB
BV BACTERIA DNA VAG QL NAA+PROBE: NEGATIVE
C GLABRATA DNA VAG QL NAA+PROBE: NEGATIVE
C KRUSEI DNA VAG QL NAA+PROBE: NEGATIVE
C TRACH DNA SPEC QL NAA+PROBE: NEGATIVE
CANDIDA DNA VAG QL NAA+PROBE: NEGATIVE
N GONORRHOEA DNA SPEC QL NAA+PROBE: NEGATIVE
T VAGINALIS DNA VAG QL NAA+PROBE: NEGATIVE

## 2025-06-25 NOTE — PROGRESS NOTES
Bernadette,    Your vaginitis panel, hepatitis C, HIV, syphilis, hepatitis B are all negative.    Sincerely,  Dr. Clifton

## 2025-06-26 ENCOUNTER — PATIENT MESSAGE (OUTPATIENT)
Dept: FAMILY MEDICINE CLINIC | Facility: CLINIC | Age: 50
End: 2025-06-26

## 2025-06-26 NOTE — TELEPHONE ENCOUNTER
Pt received hep B and Prevnar 20 at OV on 6/24, both in left arm per record  Attempted to call pt, no answer. Left message to call back   MCM sent advising pt to continue cool compress, try Tylenol, and go to UC or ER if she develops high fever, severe pain, spreading redness  Fyi/any other recs? Thanks!

## 2025-06-27 ENCOUNTER — HOSPITAL ENCOUNTER (OUTPATIENT)
Dept: MAMMOGRAPHY | Age: 50
Discharge: HOME | End: 2025-06-27
Attending: FAMILY MEDICINE
Payer: COMMERCIAL

## 2025-06-27 ENCOUNTER — HOSPITAL ENCOUNTER (OUTPATIENT)
Dept: ULTRASOUND IMAGING | Age: 50
Discharge: HOME | End: 2025-06-27
Attending: FAMILY MEDICINE
Payer: COMMERCIAL

## 2025-06-27 DIAGNOSIS — R92.2 INCONCLUSIVE MAMMOGRAM: ICD-10-CM

## 2025-06-27 PROCEDURE — 76642 ULTRASOUND BREAST LIMITED: CPT | Performed by: FAMILY MEDICINE

## 2025-06-27 PROCEDURE — 77061 BREAST TOMOSYNTHESIS UNI: CPT | Performed by: FAMILY MEDICINE

## 2025-06-27 PROCEDURE — 77065 DX MAMMO INCL CAD UNI: CPT | Performed by: FAMILY MEDICINE

## 2025-07-02 LAB — HPV E6+E7 MRNA CVX QL NAA+PROBE: POSITIVE

## 2025-07-08 ENCOUNTER — PATIENT MESSAGE (OUTPATIENT)
Dept: FAMILY MEDICINE CLINIC | Facility: CLINIC | Age: 50
End: 2025-07-08

## 2025-07-16 ENCOUNTER — OFFICE VISIT (OUTPATIENT)
Dept: FAMILY MEDICINE CLINIC | Facility: CLINIC | Age: 50
End: 2025-07-16
Payer: COMMERCIAL

## 2025-07-16 VITALS
WEIGHT: 182.25 LBS | HEART RATE: 75 BPM | HEIGHT: 63.25 IN | RESPIRATION RATE: 16 BRPM | DIASTOLIC BLOOD PRESSURE: 72 MMHG | SYSTOLIC BLOOD PRESSURE: 118 MMHG | OXYGEN SATURATION: 100 % | BODY MASS INDEX: 31.89 KG/M2

## 2025-07-16 DIAGNOSIS — J45.20 MILD INTERMITTENT ASTHMA WITHOUT COMPLICATION (HCC): ICD-10-CM

## 2025-07-16 DIAGNOSIS — Z79.899 MEDICATION MANAGEMENT: ICD-10-CM

## 2025-07-16 DIAGNOSIS — E66.09 CLASS 1 OBESITY DUE TO EXCESS CALORIES WITHOUT SERIOUS COMORBIDITY WITH BODY MASS INDEX (BMI) OF 32.0 TO 32.9 IN ADULT: Primary | ICD-10-CM

## 2025-07-16 DIAGNOSIS — K64.8 PROLAPSED HEMORRHOIDS: ICD-10-CM

## 2025-07-16 DIAGNOSIS — Z71.3 DIETARY COUNSELING: ICD-10-CM

## 2025-07-16 DIAGNOSIS — E66.811 CLASS 1 OBESITY DUE TO EXCESS CALORIES WITHOUT SERIOUS COMORBIDITY WITH BODY MASS INDEX (BMI) OF 32.0 TO 32.9 IN ADULT: Primary | ICD-10-CM

## 2025-07-16 DIAGNOSIS — Z71.82 EXERCISE COUNSELING: ICD-10-CM

## 2025-07-16 PROCEDURE — 3008F BODY MASS INDEX DOCD: CPT | Performed by: FAMILY MEDICINE

## 2025-07-16 PROCEDURE — 3078F DIAST BP <80 MM HG: CPT | Performed by: FAMILY MEDICINE

## 2025-07-16 PROCEDURE — 99214 OFFICE O/P EST MOD 30 MIN: CPT | Performed by: FAMILY MEDICINE

## 2025-07-16 PROCEDURE — 3074F SYST BP LT 130 MM HG: CPT | Performed by: FAMILY MEDICINE

## 2025-07-16 RX ORDER — ALBUTEROL SULFATE 90 UG/1
1 INHALANT RESPIRATORY (INHALATION) EVERY 4 HOURS PRN
Qty: 18 G | Refills: 11 | Status: SHIPPED | OUTPATIENT
Start: 2025-07-16

## 2025-07-16 NOTE — PROGRESS NOTES
[facility], [address] [address]  Dept: [phone]      Patient:  Bernadette Duff  :      1975  MRN:      [id number]    Chief Complaint:    Chief Complaint   Patient presents with    Weight Check    Hemorrhoids     Prolapsed hemorrhoid, would like to know your opinion.         SUBJECTIVE     History of Present Illness:  Bernadette is being seen today for a follow-up for weight.    History of Present Illness  Bernadette Duff is a 50 year old female who presents with a prolapsed hemorrhoid and weight management concerns.    She has a prolapsed hemorrhoid that became apparent after increased sexual activity, including anal intercourse. The hemorrhoid is painful and protruding, with difficulty in manual reduction. Initially, it was not painful but became so after prolapsing. She has rescheduled a colonoscopy due to irritation from the hemorrhoid.    She has been off phentermine and Wellbutrin for several months because of how they made her heart feel and has noticed an increase in appetite since discontinuing these medications. Despite this, she has lost seven pounds, attributed to increased physical activity, including hiking with significant elevation changes. She is surprised at the weight loss, given her increased appetite and eating out frequently.    She is in a new emotionally healthy relationship, which has positively impacted her mental health and comfort regarding her cysts. She has a September fourth appointment to explore options related to her cysts.    She has asthma and uses albuterol, which she needs a refill for. She has been able to engage in hiking activities despite her asthma, which she finds motivating for further physical activity.    She feels bloated, which she attributes to possibly holding in pressure.      Past Medical History:   Past Medical History:    Depression          OBJECTIVE      Vitals: /72 (BP Location: Left arm, Patient Position: Sitting, Cuff Size: adult)   Pulse 75   Resp 16   Ht 5' 3.25\" (1.607 m)   Wt 182 lb 4 oz (82.7 kg)   LMP 06/20/2025 (Exact Date)   SpO2 100%   BMI 32.03 kg/m²     Initial weight loss: 7   Total weight loss: 4   Start weight: 186    Wt Readings from Last 3 Encounters:   07/16/25 182 lb 4 oz (82.7 kg)   06/24/25 186 lb (84.4 kg)   04/11/25 189 lb (85.7 kg)       Patient Medications:    Current Outpatient Medications   Medication Sig Dispense Refill    albuterol 108 (90 Base) MCG/ACT Inhalation Aero Soln Inhale 1 puff into the lungs every 4 (four) hours as needed for Wheezing. 18 g 11    cetirizine 10 MG Oral Tab Take 1 tablet (10 mg total) by mouth daily.      Azelastine HCl 137 MCG/SPRAY Nasal Solution 1 spray by Nasal route 2 (two) times daily. 30 mL 11    ibuprofen 600 MG Oral Tab Take 1 tablet (600 mg total) by mouth every 6 (six) hours as needed for Pain.      Fluticasone Propionate 50 MCG/ACT Nasal Suspension Use 2 sprays each nostril once daily after shower. Stop if you develop nose bleeds. 1 Inhaler 0    PEG 3350-KCl-NaBcb-NaCl-NaSulf (PEG-3350/ELECTROLYTES) 236 g Oral Recon Soln Take as directed by physician (Patient not taking: Reported on 7/16/2025) 4000 mL 0    buPROPion  MG Oral Tablet 24 Hr Take by mouth. (Patient not taking: Reported on 7/16/2025)       Allergies:  Levaquin [levofloxacin], Dander, Eggs or egg-derived products, Grass, Shrimp, and Levothyroxine     Social History:    Social History     Socioeconomic History    Marital status:      Spouse name: Not on file    Number of children: Not on file    Years of education: Not on file    Highest education level: Not on file   Occupational History    Not on file   Tobacco Use    Smoking status: Never    Smokeless tobacco: Never   Vaping Use    Vaping status: Never Used   Substance and Sexual Activity    Alcohol use: Yes     Alcohol/week: 1.0 standard drink of alcohol      Types: 1 Glasses of wine per week     Comment: 1q week    Drug use: No    Sexual activity: Not on file   Other Topics Concern     Service Not Asked    Blood Transfusions Not Asked    Caffeine Concern No     Comment: off on    Occupational Exposure Not Asked    Hobby Hazards Not Asked    Sleep Concern Not Asked    Stress Concern Not Asked    Weight Concern Not Asked    Special Diet Not Asked    Back Care Not Asked    Exercise No     Comment: not lately    Bike Helmet Not Asked    Seat Belt Not Asked    Self-Exams Not Asked   Social History Narrative    Not on file     Social Drivers of Health     Food Insecurity: No Food Insecurity (2025)    NCSS - Food Insecurity     Worried About Running Out of Food in the Last Year: No     Ran Out of Food in the Last Year: No   Transportation Needs: No Transportation Needs (2025)    NCSS - Transportation     Lack of Transportation: No   Stress: Not on file   Housing Stability: Not At Risk (2025)    NCSS - Housing/Utilities     Has Housing: Yes     Worried About Losing Housing: No     Unable to Get Utilities: No     Surgical History:    Past Surgical History:   Procedure Laterality Date          D & c  2007    Sinus surgery         Family History:    Family History   Problem Relation Age of Onset    Psychiatric Mother         bipolar    Psychiatric Son         bipolar    Breast Cancer Maternal Grandmother 40    Cancer Maternal Grandmother         breast at age 40's    Cancer Maternal Grandfather     Cancer Paternal Grandmother     Cancer Paternal Grandfather     Breast Cancer Maternal Aunt 60    Cancer Maternal Aunt         breast in her 60's    Breast Cancer Maternal Aunt 80        80's    Heart Disease Neg     Stroke Neg         Food Journal  Reviewed and Discussed:          Patient has a Food Journal?:  no, but took pics of food   super consistent   Patient is reading nutrition labels?  yes, most of the time.   Average Caloric Intake:           unsure  Average CHO Intake:  unsure  Is patient exercising?  yes  Walking with her dog (2.5 miles), hiking      Eating Habits  Patient states the following:  Eats 3 meal(s) per day  Length of time it takes to consume a meal:  10 mins  # of snacks per day: 1        Type of snacks: Optivia bars   Amount of soda consumption per day:  1 soda and 1 alcoholic kylie a week.  Amount of water (in ounces) per day:  64 oz  Drinking between meals only:  no  Toughest challenge: Portion control and appetite control.         The following individual(s) verbally consented to be recorded using ambient AI listening technology and understand that they can each withdraw their consent to this listening technology at any point by asking the clinician to turn off or pause the recording:     Patient name: Bernadette Duff      ROS:    Pertinent items are noted in HPI.  A comprehensive review of systems was negative.  All other systems were reviewed and are negative    Physical Exam:   /72 (BP Location: Left arm, Patient Position: Sitting, Cuff Size: adult)   Pulse 75   Resp 16   Ht 5' 3.25\" (1.607 m)   Wt 182 lb 4 oz (82.7 kg)   LMP 06/20/2025 (Exact Date)   SpO2 100%   BMI 32.03 kg/m²   General appearance: alert, appears stated age, and cooperative  Head: Normocephalic, without obvious abnormality, atraumatic  Neck: post. Cervical bilaterally adenopathy, no carotid bruit, no JVD, supple, symmetrical, trachea midline, and thyroid not enlarged, symmetric, no tenderness/mass/nodules  Lungs: clear to auscultation bilaterally  Heart: S1, S2 normal, no murmur, click, rub or gallop, regular rate and rhythm  Abdomen: soft, non-tender; bowel sounds normal; no masses,  no organomegaly  Extremities: extremities normal, atraumatic, no cyanosis or edema  Skin: Skin color, texture, turgor normal. No rashes or lesions    ASSESSMENT/PLAN     Encounter Diagnoses   Name Primary?    Class 1 obesity due to excess calories without serious  comorbidity with body mass index (BMI) of 32.0 to 32.9 in adult Yes    Mild intermittent asthma without complication (HCC)     Dietary counseling     Exercise counseling     Medication management     Prolapsed hemorrhoids          No orders of the defined types were placed in this encounter.      Nutritional Goals  Other:  food journal; deferred dietician; 1500    Behavior Modifications Reviewed and Discussed  Drink 64 oz of water per day, Maintain a daily food journal, Identify triggers for eating and manage cues, and Eat slowly and take 20 to 30 minutes to complete each meal    Exercise Goals Reviewed and Discussed    Walk for 30 minutes daily      Meds & Refills for this Visit:  Requested Prescriptions     Signed Prescriptions Disp Refills    albuterol 108 (90 Base) MCG/ACT Inhalation Aero Soln 18 g 11     Sig: Inhale 1 puff into the lungs every 4 (four) hours as needed for Wheezing.       Imaging & Consults:  SURGERY - INTERNAL  Encounter Diagnoses   Name Primary?    Class 1 obesity due to excess calories without serious comorbidity with body mass index (BMI) of 32.0 to 32.9 in adult Yes    Mild intermittent asthma without complication (HCC)     Dietary counseling     Exercise counseling     Medication management     Prolapsed hemorrhoids        No orders of the defined types were placed in this encounter.      Meds & Refills for this Visit:  Requested Prescriptions     Signed Prescriptions Disp Refills    albuterol 108 (90 Base) MCG/ACT Inhalation Aero Soln 18 g 11     Sig: Inhale 1 puff into the lungs every 4 (four) hours as needed for Wheezing.       Imaging & Consults:  SURGERY - INTERNAL    Stopped phentermine.  Affecting her heart.  Stopped wellbutrin.  Doing well.  Advised to lose 2-4 lbs a month.      Assessment & Plan  Prolapsed Hemorrhoid  Significant discomfort from prolapsed hemorrhoid, manual reduction unsuccessful, potential need for surgical intervention discussed.  - Refer to Dr. Rock,  colorectal surgeon, for evaluation.  - Advise sitz baths and use of Preparation H for symptomatic relief.  - Recommend use of a donut cushion for comfort.  - Discuss potential need for surgical intervention if reduction is unsuccessful.    Obesity  BMI 32, seven-pound weight loss due to increased physical activity. Discontinued phentermine and Wellbutrin due to cardiac effects. Advised against Zepbound due to potential muscle loss and lifelong commitment. Encouraged lifestyle modifications for sustainable weight loss.  - Encourage continuation of exercise regimen, including hiking and potentially gym workouts.  - Advise maintaining a food journal to monitor dietary habits.  - Reassess weight management plan in three months, with a follow-up in October.    Asthma  - Refill albuterol prescription at [facility] on [address].    Mental Health Management  In a healthy relationship, positively impacting mental well-being. Advised against Lexapro due to potential weight gain, recommended fluoxetine as an alternative.  - Discuss with psychiatrist the potential use of fluoxetine instead of Lexapro.  - Continue with the scheduled appointment on September 4th to explore options for cyst management.    Follow-up  Plan to reassess weight management strategy and address hemorrhoid issue.  - Schedule follow-up appointment in four weeks to review weight management progress and food journal.  - Ensure follow-up with Dr. Rock for hemorrhoid evaluation.  - Reschedule colonoscopy after hemorrhoid evaluation.        Return in about 4 weeks (around 8/13/2025) for OM -15.

## 2025-07-16 NOTE — PROGRESS NOTES
Food Journal  Reviewed and Discussed:          Patient has a Food Journal?:  no, but took pics of food   super consistent   Patient is reading nutrition labels?  yes, most of the time.   Average Caloric Intake:          unsure  Average CHO Intake:  unsure  Is patient exercising?  yes  Walking with her dog (2.5 miles), hiking      Eating Habits  Patient states the following:  Eats 3 meal(s) per day  Length of time it takes to consume a meal:  10 mins  # of snacks per day: 1        Type of snacks: Optivia bars   Amount of soda consumption per day:  1 soda and 1 alcoholic kylie a week.  Amount of water (in ounces) per day:  64 oz  Drinking between meals only:  no  Toughest challenge: Portion control and appetite control.         The following individual(s) verbally consented to be recorded using ambient AI listening technology and understand that they can each withdraw their consent to this listening technology at any point by asking the clinician to turn off or pause the recording:    Patient name: Bernadette Miller Omega

## 2025-08-15 ENCOUNTER — OFFICE VISIT (OUTPATIENT)
Dept: FAMILY MEDICINE CLINIC | Facility: CLINIC | Age: 50
End: 2025-08-15

## 2025-08-15 VITALS
OXYGEN SATURATION: 96 % | HEART RATE: 90 BPM | HEIGHT: 63.25 IN | SYSTOLIC BLOOD PRESSURE: 110 MMHG | WEIGHT: 182.25 LBS | DIASTOLIC BLOOD PRESSURE: 76 MMHG | RESPIRATION RATE: 16 BRPM | BODY MASS INDEX: 31.89 KG/M2

## 2025-08-15 DIAGNOSIS — E66.811 CLASS 1 OBESITY DUE TO EXCESS CALORIES WITHOUT SERIOUS COMORBIDITY WITH BODY MASS INDEX (BMI) OF 32.0 TO 32.9 IN ADULT: Primary | ICD-10-CM

## 2025-08-15 DIAGNOSIS — E66.09 CLASS 1 OBESITY DUE TO EXCESS CALORIES WITHOUT SERIOUS COMORBIDITY WITH BODY MASS INDEX (BMI) OF 32.0 TO 32.9 IN ADULT: Primary | ICD-10-CM

## 2025-08-15 DIAGNOSIS — Z71.3 DIETARY COUNSELING: ICD-10-CM

## 2025-08-15 DIAGNOSIS — Z71.82 EXERCISE COUNSELING: ICD-10-CM

## 2025-08-15 DIAGNOSIS — Z79.899 MEDICATION MANAGEMENT: ICD-10-CM

## 2025-08-15 LAB
ATRIAL RATE: 79 BPM
P AXIS: 49 DEGREES
P-R INTERVAL: 130 MS
Q-T INTERVAL: 388 MS
QRS DURATION: 84 MS
QTC CALCULATION (BEZET): 444 MS
R AXIS: 8 DEGREES
T AXIS: 16 DEGREES
VENTRICULAR RATE: 79 BPM

## 2025-08-15 RX ORDER — DIETHYLPROPION HYDROCHLORIDE 25 MG/1
1 TABLET ORAL DAILY
Qty: 30 TABLET | Refills: 1 | Status: SHIPPED | OUTPATIENT
Start: 2025-08-15 | End: 2025-09-14

## (undated) NOTE — MR AVS SNAPSHOT
[facility], [address] [address] [id number]               Thank you for choosing us for your health care visit with Jesus Casanova DO.   We are glad to serve you and happy to provide you with this summary Assoc Dx:  Chronic sinusitis, unspecified location [J32.9]          Reason for Today's Visit     Medication Follow-Up           Medical Issues Discussed Today     Chronic sinusitis      Instructions and Information about Your Health     None      Allergie

## (undated) NOTE — LETTER
08/19/24        Bernadette Duff  [address] [facility] [address]      Dear Bernadette,    Our records indicate that you have outstanding lab work and or testing that was ordered for you and has not yet been completed:  ZOSTER VACCINE RECOMBINANT ADJUVANTED (SHINGRIX)     To provide you with the best possible care, please complete these orders at your earliest convenience. If you have recently completed these orders please disregard this letter.     If you have any questions please call the office at Dept: [phone].     Thank you,   EMG 11

## (undated) NOTE — ED AVS SNAPSHOT
[facility] in [address]    [address] [place] [address]    Phone:  [phone]    [facility]  [facility]   MRN: [id number]    Department:  THE [facility] OF [facility] in [address] [place] [address],[address]   Date of Visit:  6/6/2017 Quantity:  20 tablet   Commonly known as:  ZOFRAN-ODT   Take 1 tablet (4 mg total) by mouth every 4 (four) hours as needed.             Where to Get Your Medications      These medications were sent to [address] self-assessment the day after your visit. You may also receive a call from our patient liason soon after your visit. Also, some patients receive a detailed feedback survey mailed to them a week after the visit.   If you receive this, we would really apprec Theresa Swenson [address] [place] [phone] [address] ([address] [facility] [address] ([address]) [facility] [address] [facility].  (Cat

## (undated) NOTE — LETTER
03/17/25        Bernadette Duff  [address] [facility] [address]      Dear Bernadette,    Our records indicate that you have outstanding lab work and or testing that was ordered for you and has not yet been completed:  Orders Placed This Encounter      PREVNAR 20    To provide you with the best possible care, please complete these orders at your earliest convenience. If you have recently completed these orders please disregard this letter.     If you have any questions please call the office at Dept: [phone].     Thank you,   EMG 11

## (undated) NOTE — LETTER
ASTHMA ACTION PLAN for Bernadette Duff     : 1975     Date: 2025  Provider:  Sophie Clifton DO  Phone for doctor or clinic: [facility], [facility], [address]  [facility] [address]  [phone]    ACT Score: 25      You can use the colors of a traffic light to help learn about your asthma medicines.      1. Green - Go! % of Personal Best Peak Flow Use controller medicine.   Breathing is good  No cough or wheeze  Can work and play Medicine How much to take When to take it    Cetirizine 10 mg- take one tablet by mouth daily.    Azelastine  MCG/ Spray Nasal Solution- use 2 sprays in each nostril daily.    Fluticasone Propionate 20 MCG/ACT Nasal Suspension- use 2 sprays in each nostril daily.       2. Yellow - Caution. 50-79% Personal Best Peak  Flow.  Use reliever medicine to keep an asthma attack from getting bad.   Cough  Wheezing  Tight Chest  Wake up at night Medicine How much to take When to take it    Albuterol inhaler,  2 puffs every four hours as needed.        Additional instructions         3. Red - Stop! Danger!  <50% Personal Best Peak  Flow. Take these medications until  Get help from a doctor   Medicine not helping  Breathing is hard and fast  Nose opens wide  Can't walk  Ribs show  Can't talk well Medicine How much to take When to take it    Albuterol inhaler,  2 puffs every five minutes until symptoms resolve, if symptoms do not resolve call 911 or head to the nearest emergency room.    DO NOT DRIVE YOURSELF!!!      Additional Instructions If your symptoms do not improve and you cannot contact your doctor, [facility] or call 911 immediately!     [x] Asthma Action Plan reviewed with patient (and caregiver if necessary) and a copy of the plan was given to the patient/caregiver.   [] Asthma Action Plan reviewed with patient (and caregiver if necessary) on the phone and mailed copy to patient or submitted via Sharelook.      Signatures:  Provider  Sophie Clifton DO   Patient Caretaker

## (undated) NOTE — MR AVS SNAPSHOT
[facility], [address] [address] [phone]               Thank you for choosing us for your health care visit with Ruthie Farley DO.   We are glad to serve you and happy to provide you with this summary [phone]  [facility] [address]     Phone:  [phone]    - Amoxicillin-Pot Clavulanate 875-125 MG Tabs  - BuPROPion HCl ER (XL) 150 MG Tb24            MyChart     Visit MyChart  You can access your MyChart to more actively ma increments are effective and add up over the week   2 ½ hours per week – spread out over time Use a ruth ann to keep you motivated   Don’t forget strength training with weights and resistance Set goals and track your progress   You don’t need to join a gym.